# Patient Record
Sex: MALE | Race: WHITE | NOT HISPANIC OR LATINO | Employment: FULL TIME | URBAN - METROPOLITAN AREA
[De-identification: names, ages, dates, MRNs, and addresses within clinical notes are randomized per-mention and may not be internally consistent; named-entity substitution may affect disease eponyms.]

---

## 2017-01-31 ENCOUNTER — RADIANT APPOINTMENT (OUTPATIENT)
Dept: GENERAL RADIOLOGY | Facility: CLINIC | Age: 42
End: 2017-01-31
Attending: PHYSICIAN ASSISTANT
Payer: COMMERCIAL

## 2017-01-31 ENCOUNTER — OFFICE VISIT (OUTPATIENT)
Dept: INTERNAL MEDICINE | Facility: CLINIC | Age: 42
End: 2017-01-31
Payer: COMMERCIAL

## 2017-01-31 VITALS
WEIGHT: 215.9 LBS | SYSTOLIC BLOOD PRESSURE: 150 MMHG | OXYGEN SATURATION: 98 % | BODY MASS INDEX: 29.28 KG/M2 | DIASTOLIC BLOOD PRESSURE: 110 MMHG | TEMPERATURE: 98.6 F | HEART RATE: 108 BPM

## 2017-01-31 DIAGNOSIS — R03.0 ELEVATED BLOOD PRESSURE READING WITHOUT DIAGNOSIS OF HYPERTENSION: ICD-10-CM

## 2017-01-31 DIAGNOSIS — S69.91XA FINGER INJURY, RIGHT, INITIAL ENCOUNTER: ICD-10-CM

## 2017-01-31 DIAGNOSIS — S69.91XA FINGER INJURY, RIGHT, INITIAL ENCOUNTER: Primary | ICD-10-CM

## 2017-01-31 PROCEDURE — 99214 OFFICE O/P EST MOD 30 MIN: CPT | Performed by: PHYSICIAN ASSISTANT

## 2017-01-31 PROCEDURE — 73140 X-RAY EXAM OF FINGER(S): CPT | Mod: RT

## 2017-01-31 NOTE — PATIENT INSTRUCTIONS
- ibuprofen - advil 600 mg three times a day with food.    OR    Aleve - naproxen 2 tab twice a day     Either one of these for 1 week.     Wear the splint for 1-2 week

## 2017-01-31 NOTE — PROGRESS NOTES
SUBJECTIVE:                                                    Kobe Hanson Jr. is a 41 year old male who presents to clinic today for the following health issues:      Joint Pain     Onset: x 2.5 weeks    Description:   Location: R thumb  Character: Sharp and Burning    Intensity: moderate    Progression of Symptoms: same    Accompanying Signs & Symptoms:  Other symptoms: none   History:   Previous similar pain: no       Precipitating factors:   Trauma or overuse: YES--caught himself    Alleviating factors:  Improved by: ice       Therapies Tried and outcome:       -------------------------------------    Problem list and histories reviewed & adjusted, as indicated.  Additional history: as documented    Labs reviewed in EPIC  Problem list, Medication list, Allergies, and Medical/Social/Surgical histories reviewed in [facility] and updated as appropriate.    ROS:  Constitutional, HEENT, cardiovascular, pulmonary, gi and gu systems are negative, except as otherwise noted.    OBJECTIVE:                                                    /110 mmHg  Pulse 108  Temp(Src) 98.6  F (37  C) (Oral)  Wt 215 lb 14.4 oz (97.932 kg)  SpO2 98%  Body mass index is 29.28 kg/(m^2).  GENERAL: healthy, alert and no distress  RESP: lungs clear to auscultation - no rales, rhonchi or wheezes  CV: regular rate and rhythm, normal S1 S2, no S3 or S4, no murmur, click or rub, no peripheral edema and peripheral pulses strong  MS: swelling DIP of the right thumb. No bruising or redness noted.     Diagnostic Test Results:  Prelim- no fx      ASSESSMENT/PLAN:                                                            1. Finger injury, right, initial encounter    - XR Finger Right G/E 2 Views; Future  - order for DME; Equipment being ordered: thumb spica splint right  Dispense: 1 Device; Refill: 0    2. Elevated blood pressure reading without diagnosis of hypertension  Schedule with PCP for PE and labs- hx of elevated BP in the past.        25 minutes spent with patient > 50% of time on counseling and plan of care.     Debo Mccray PA-C  [facility]

## 2017-01-31 NOTE — NURSING NOTE
Chief Complaint   Patient presents with     Musculoskeletal Problem     feel on R hand thumb 2.5 weeks ago        Initial /110 mmHg  Pulse 108  Temp(Src) 98.6  F (37  C) (Oral)  Wt 215 lb 14.4 oz (97.932 kg)  SpO2 98% Estimated body mass index is 29.28 kg/(m^2) as calculated from the following:    Height as of 3/6/14: 6' (1.829 m).    Weight as of this encounter: 215 lb 14.4 oz (97.932 kg).  BP completed using cuff size: regular

## 2017-02-22 ENCOUNTER — OFFICE VISIT (OUTPATIENT)
Dept: INTERNAL MEDICINE | Facility: CLINIC | Age: 42
End: 2017-02-22
Payer: COMMERCIAL

## 2017-02-22 VITALS
OXYGEN SATURATION: 99 % | SYSTOLIC BLOOD PRESSURE: 120 MMHG | BODY MASS INDEX: 29.4 KG/M2 | TEMPERATURE: 98.1 F | HEART RATE: 68 BPM | DIASTOLIC BLOOD PRESSURE: 90 MMHG | WEIGHT: 216.8 LBS

## 2017-02-22 DIAGNOSIS — S69.91XD INJURY OF RIGHT THUMB, SUBSEQUENT ENCOUNTER: Primary | ICD-10-CM

## 2017-02-22 PROCEDURE — 99213 OFFICE O/P EST LOW 20 MIN: CPT | Performed by: PHYSICIAN ASSISTANT

## 2017-02-22 NOTE — MR AVS SNAPSHOT
After Visit Summary   2/22/2017    Kobe Hanson Jr.    MRN: [id number]           Patient Information     Date Of Birth          1975        Visit Information        Provider Department      2/22/2017 7:40 AM Debo Mccray PA-C [facility]        Today's Diagnoses     Injury of right thumb, subsequent encounter    -  1    Elevated BP           Follow-ups after your visit        Additional Services     ORTHOPEDICS ADULT REFERRAL       Your provider has referred you to: [facility] [place] [phone]   [email or web address]     Please be aware that coverage of these services is subject to the terms and limitations of your health insurance plan.  Call member services at your health plan with any benefit or coverage questions.      Please bring the following to your appointment:    >>   Any x-rays, CTs or MRIs which have been performed.  Contact the facility where they were done to arrange for  prior to your scheduled appointment.    >>   List of current medications   >>   This referral request   >>   Any documents/labs given to you for this referral                  Who to contact     If you have questions or need follow up information about today's clinic visit or your schedule please contact [facility] directly at [phone].  Normal or non-critical lab and imaging results will be communicated to you by MyChart, letter or phone within 4 business days after the clinic has received the results. If you do not hear from us within 7 days, please contact the clinic through MyChart or phone. If you have a critical or abnormal lab result, we will notify you by phone as soon as possible.  Submit refill requests through [employer] or call your pharmacy and they will forward the refill request to us. Please allow 3 business days for your refill to be completed.          Additional Information  "About Your Visit        MyChart Information     [facility] lets you send messages to your doctor, view your test results, renew your prescriptions, schedule appointments and more. To sign up, go to [email or web address] . Click on \"Log in\" on the left side of the screen, which will take you to the Welcome page. Then click on \"Sign up Now\" on the right side of the page.     You will be asked to enter the access code listed below, as well as some personal information. Please follow the directions to create your username and password.     Your access code is: [email or web address]  Expires: 2017  4:45 PM     Your access code will  in 90 days. If you need help or a new code, please call your [facility] clinic or [phone].        Care EveryWhere ID     This is your Care EveryWhere ID. This could be used by other organizations to access your [facility] medical records  [id number]        Your Vitals Were     Pulse Temperature Pulse Oximetry BMI (Body Mass Index)          68 98.1  F (36.7  C) (Oral) 99% 29.4 kg/m2         Blood Pressure from Last 3 Encounters:   17 120/90   17 (!) 150/110   14 (!) 134/96    Weight from Last 3 Encounters:   17 216 lb 12.8 oz (98.3 kg)   17 215 lb 14.4 oz (97.9 kg)   14 215 lb 8 oz (97.8 kg)              We Performed the Following     ORTHOPEDICS ADULT REFERRAL        Primary Care Provider Office Phone # Fax #    Santos Jimenez [phone]       [facility] [address] [facility] [address]        Thank you!     Thank you for choosing [facility]  for your care. Our goal is always to provide you with excellent care. Hearing back from our patients is one way we can continue to improve our services. Please take a few minutes to complete the written survey that you may receive in the mail after your visit with us. Thank you!             Your Updated Medication List - Protect others around you: Learn " how to safely use, store and throw away your medicines at www.disposemymeds.org.          This list is accurate as of: 2/22/17  7:54 AM.  Always use your most recent med list.                   Brand Name Dispense Instructions for use    ciclopirox 0.77 % cream    LOPROX    30 g    Apply to chest at bedtime       desonide 0.05 % cream    DESOWEN    60 g    Apply sparingly to affected area twice daily on eyelids       fluocinolone 0.025 % cream    SYNALAR    60 g    Apply to chest once daily       order for DME     1 Device    Equipment being ordered: thumb spica splint right

## 2017-02-22 NOTE — NURSING NOTE
Chief Complaint   Patient presents with     Musculoskeletal Problem     Pain hasnt changed at all        Initial /90 (BP Location: Left arm, Patient Position: Chair, Cuff Size: Adult Regular)  Pulse 68  Temp 98.1  F (36.7  C) (Oral)  Wt 216 lb 12.8 oz (98.3 kg)  SpO2 99%  BMI 29.4 kg/m2 Estimated body mass index is 29.4 kg/(m^2) as calculated from the following:    Height as of 3/6/14: 6' (1.829 m).    Weight as of this encounter: 216 lb 12.8 oz (98.3 kg).  Medication Reconciliation: complete

## 2017-02-22 NOTE — PROGRESS NOTES
SUBJECTIVE:                                                    Kobe Hanson Jr. is a 41 year old male who presents to clinic today for the following health issues:      Joint Pain     Onset: x6 wks     Description:   Location: R thumb  Character: Dull ache    Intensity: mild    Progression of Symptoms: same    Accompanying Signs & Symptoms:  Other symptoms: none   History:   Previous similar pain: no       Precipitating factors:   Trauma or overuse: YES fell on it     Alleviating factors:  Improved by: nothing       Therapies Tried and outcome:     Patient seen at the end of January for this. Xray done then negative.     He did not get a thumb spica splint at prescribed and today is wearing a foam finger splint.    Issues with pain that the DIP of the right thumb. Feels that he does not have good strength due to pain there.           -------------------------------------    Problem list and histories reviewed & adjusted, as indicated.  Additional history: as documented    Labs reviewed in EPIC  Problem list, Medication list, Allergies, and Medical/Social/Surgical histories reviewed in [facility] and updated as appropriate.    ROS:  Constitutional, HEENT, cardiovascular, pulmonary, gi and gu systems are negative, except as otherwise noted.    OBJECTIVE:                                                    /90 (BP Location: Left arm, Patient Position: Chair, Cuff Size: Adult Regular)  Pulse 68  Temp 98.1  F (36.7  C) (Oral)  Wt 216 lb 12.8 oz (98.3 kg)  SpO2 99%  BMI 29.4 kg/m2  Body mass index is 29.4 kg/(m^2).  GENERAL: healthy, alert and no distress  RESP: lungs clear to auscultation - no rales, rhonchi or wheezes  CV: regular rates and rhythm and no murmur, click or rub  MS: right thumb, slight swelling DIP joint    Diagnostic Test Results:  Results for orders placed or performed in visit on 01/31/17   XR Finger Right G/E 2 Views    Narrative    FINGER RIGHT TWO OR MORE VIEWS 1/31/2017 4:59 PM     HISTORY:  Injury right thumb distal. Unspecified injury of right wrist,  hand and finger(s), initial encounter.      Impression    IMPRESSION: Three views of the right thumb are obtained. There is no  fracture or dislocation. Joint spaces appear normal. No radiopaque  foreign body is appreciated. No soft tissue swelling is noted  radiographically.    XU CREWS MD        ASSESSMENT/PLAN:                                                            1. Injury of right thumb, subsequent encounter    - ORTHOPEDICS ADULT REFERRAL    2. Elevated BP        See hand specialist  F/u with PCP on BP as recommended at last visit.     Debo Mccray PA-C  [facility]

## 2017-02-24 ENCOUNTER — TRANSFERRED RECORDS (OUTPATIENT)
Dept: HEALTH INFORMATION MANAGEMENT | Facility: CLINIC | Age: 42
End: 2017-02-24

## 2018-02-19 ENCOUNTER — NURSE TRIAGE (OUTPATIENT)
Dept: NURSING | Facility: CLINIC | Age: 43
End: 2018-02-19

## 2018-02-19 NOTE — TELEPHONE ENCOUNTER
Reason for Disposition    Numbness (loss of sensation) in groin or rectal area    Additional Information    Negative: Dangerous mechanism of injury (e.g., MVA, contact sports, trampoline, diving, fall > 10 feet or 3 meters)  (Exception: back pain began > 1 hour after injury)    Negative: [1] Weakness (i.e., paralysis, loss of muscle strength) of the leg(s) or foot AND [2] sudden onset after back injury    Negative: [1] Numbness (i.e., loss of sensation) of the leg(s) or foot AND [2] sudden onset after back injury    Negative: [1] Major bleeding (e.g., actively dripping or spurting) AND [2] can't be stopped    Negative: Bullet wound, knife wound, or other penetrating object    Negative: Shock suspected (e.g., cold/pale/clammy skin, too weak to stand, low BP, rapid pulse)    Negative: Sounds like a life-threatening emergency to the triager    Negative: [1] Injuries at more than 1 site AND [2] unsure which guideline to use    Negative: Injury to the neck    Negative: Injury to the tailbone    Negative: Back pain not from an injury    Negative: Back pain from overuse (work, exercise, gardening) OR from twisting, lifting, or bending injury    Negative: [1] SEVERE PAIN in kidney area (flank) AND [2] follows direct blow to that site    Negative: Blood in urine (red, pink, or tea-colored)    Negative: [1] Unable to urinate (or only a few drops) > 4 hours AND     [2] bladder feels very full (e.g., palpable bladder or strong urge to urinate)    Negative: [1] Urinary or bowel incontinence (i.e., loss of bladder or bowel control) AND [2] new onset    Protocols used: BACK INJURY-ADULT-AH    He was doing yoga on Saturday when he pulled something.  He can barely walk. Is going to ice it. Should I see my primary or go to the ER? Advised the ER. His wife will bring him for evaluation.  Jacinda Ballard RN-[facility]

## 2018-03-21 ENCOUNTER — OFFICE VISIT (OUTPATIENT)
Dept: URGENT CARE | Facility: URGENT CARE | Age: 43
End: 2018-03-21
Payer: COMMERCIAL

## 2018-03-21 VITALS
HEART RATE: 96 BPM | OXYGEN SATURATION: 100 % | DIASTOLIC BLOOD PRESSURE: 90 MMHG | RESPIRATION RATE: 18 BRPM | SYSTOLIC BLOOD PRESSURE: 120 MMHG | WEIGHT: 213.6 LBS | TEMPERATURE: 97.6 F | HEIGHT: 72 IN | BODY MASS INDEX: 28.93 KG/M2

## 2018-03-21 DIAGNOSIS — R35.0 URINARY FREQUENCY: Primary | ICD-10-CM

## 2018-03-21 DIAGNOSIS — R10.9 FLANK PAIN: ICD-10-CM

## 2018-03-21 LAB
ALBUMIN UR-MCNC: NEGATIVE MG/DL
ANION GAP SERPL CALCULATED.3IONS-SCNC: 5 MMOL/L (ref 3–14)
APPEARANCE UR: CLEAR
BASOPHILS # BLD AUTO: 0 10E9/L (ref 0–0.2)
BASOPHILS NFR BLD AUTO: 0.6 %
BILIRUB UR QL STRIP: NEGATIVE
BUN SERPL-MCNC: 15 MG/DL (ref 7–30)
CALCIUM SERPL-MCNC: 9.2 MG/DL (ref 8.5–10.1)
CHLORIDE SERPL-SCNC: 104 MMOL/L (ref 94–109)
CO2 SERPL-SCNC: 30 MMOL/L (ref 20–32)
COLOR UR AUTO: YELLOW
CREAT SERPL-MCNC: 0.96 MG/DL (ref 0.66–1.25)
DIFFERENTIAL METHOD BLD: NORMAL
EOSINOPHIL # BLD AUTO: 0.2 10E9/L (ref 0–0.7)
EOSINOPHIL NFR BLD AUTO: 2.7 %
ERYTHROCYTE [DISTWIDTH] IN BLOOD BY AUTOMATED COUNT: 11.6 % (ref 10–15)
GFR SERPL CREATININE-BSD FRML MDRD: 87 ML/MIN/1.7M2
GLUCOSE SERPL-MCNC: 95 MG/DL (ref 70–99)
GLUCOSE UR STRIP-MCNC: NEGATIVE MG/DL
HCT VFR BLD AUTO: 49.4 % (ref 40–53)
HGB BLD-MCNC: 17.6 G/DL (ref 13.3–17.7)
HGB UR QL STRIP: NEGATIVE
KETONES UR STRIP-MCNC: NEGATIVE MG/DL
LEUKOCYTE ESTERASE UR QL STRIP: NEGATIVE
LYMPHOCYTES # BLD AUTO: 1.9 10E9/L (ref 0.8–5.3)
LYMPHOCYTES NFR BLD AUTO: 27.2 %
MCH RBC QN AUTO: 32.6 PG (ref 26.5–33)
MCHC RBC AUTO-ENTMCNC: 35.6 G/DL (ref 31.5–36.5)
MCV RBC AUTO: 92 FL (ref 78–100)
MONOCYTES # BLD AUTO: 0.6 10E9/L (ref 0–1.3)
MONOCYTES NFR BLD AUTO: 8.4 %
NEUTROPHILS # BLD AUTO: 4.4 10E9/L (ref 1.6–8.3)
NEUTROPHILS NFR BLD AUTO: 61.1 %
NITRATE UR QL: NEGATIVE
PH UR STRIP: 6.5 PH (ref 5–7)
PLATELET # BLD AUTO: 215 10E9/L (ref 150–450)
POTASSIUM SERPL-SCNC: 3.6 MMOL/L (ref 3.4–5.3)
RBC # BLD AUTO: 5.4 10E12/L (ref 4.4–5.9)
RBC #/AREA URNS AUTO: NORMAL /HPF
SODIUM SERPL-SCNC: 139 MMOL/L (ref 133–144)
SOURCE: NORMAL
SP GR UR STRIP: <=1.005 (ref 1–1.03)
UROBILINOGEN UR STRIP-ACNC: 0.2 EU/DL (ref 0.2–1)
WBC # BLD AUTO: 7.1 10E9/L (ref 4–11)
WBC #/AREA URNS AUTO: NORMAL /HPF

## 2018-03-21 PROCEDURE — 85025 COMPLETE CBC W/AUTO DIFF WBC: CPT | Performed by: PHYSICIAN ASSISTANT

## 2018-03-21 PROCEDURE — 80048 BASIC METABOLIC PNL TOTAL CA: CPT | Performed by: PHYSICIAN ASSISTANT

## 2018-03-21 PROCEDURE — 87591 N.GONORRHOEAE DNA AMP PROB: CPT | Performed by: PHYSICIAN ASSISTANT

## 2018-03-21 PROCEDURE — 87086 URINE CULTURE/COLONY COUNT: CPT | Performed by: PHYSICIAN ASSISTANT

## 2018-03-21 PROCEDURE — 99214 OFFICE O/P EST MOD 30 MIN: CPT | Performed by: PHYSICIAN ASSISTANT

## 2018-03-21 PROCEDURE — 87491 CHLMYD TRACH DNA AMP PROBE: CPT | Performed by: PHYSICIAN ASSISTANT

## 2018-03-21 PROCEDURE — 36415 COLL VENOUS BLD VENIPUNCTURE: CPT | Performed by: PHYSICIAN ASSISTANT

## 2018-03-21 PROCEDURE — 81001 URINALYSIS AUTO W/SCOPE: CPT | Performed by: PHYSICIAN ASSISTANT

## 2018-03-21 RX ORDER — IBUPROFEN 800 MG/1
800 TABLET, FILM COATED ORAL EVERY 8 HOURS PRN
Qty: 30 TABLET | Refills: 0 | Status: SHIPPED | OUTPATIENT
Start: 2018-03-21 | End: 2020-03-12

## 2018-03-21 RX ORDER — ACETAMINOPHEN AND CODEINE PHOSPHATE 300; 30 MG/1; MG/1
1-2 TABLET ORAL EVERY 4 HOURS PRN
Qty: 10 TABLET | Refills: 0 | Status: SHIPPED | OUTPATIENT
Start: 2018-03-21 | End: 2019-08-20

## 2018-03-21 NOTE — PROGRESS NOTES
SUBJECTIVE:   Kobe Hanson Jr. is a 42 year old male who  presents today for a possible UTI.   Complains of right sided flank pain since 10 am.  Was at chiropractor for a maintenance appointment, and developed the pain shortly thereafter with urinary frequency.  Denies any penile discharge or dysuria.    Denies any abdominal pain.  Denies any blood in the urine.      He has not taken anything for the symptoms.     He states that he has urinated about 40 times since the onset of the symptoms.    Had a foot cramp this morning, and thought that he was dehydrated, and he as had 2 more 16 oz iced teas than he usually has in a morning, he usually has 4 and has a coffee and a couple of henry.      Foot has not bothered him since.      Denies any lifting injury.      He was at chiropractor for a routine adjustment.      No past medical history on file.  Patient Active Problem List   Diagnosis     Hyperlipidemia LDL goal <160     Elevated BP     Abnormal results of liver function studies     Social History   Substance Use Topics     Smoking status: Former Smoker     Quit date: 2/3/2007     Smokeless tobacco: Never Used     Alcohol use Yes       ROS:   CONSTITUTIONAL:NEGATIVE for fever, chills, change in weight  INTEGUMENTARY/SKIN: NEGATIVE for worrisome rashes, moles or lesions  EYES: NEGATIVE for vision changes or irritation  ENT/MOUTH: NEGATIVE for ear, mouth and throat problems  RESP:NEGATIVE for significant cough or SOB  CV: NEGATIVE for chest pain, palpitations or peripheral edema  GI: NEGATIVE for nausea, abdominal pain, heartburn, or change in bowel habits  : as per HPI  MUSCULOSKELETAL: as per HPI  NEURO: NEGATIVE for weakness, dizziness or paresthesias  Review of systems negative except as stated above.    OBJECTIVE:  /90  Pulse 96  Temp 97.6  F (36.4  C) (Oral)  Resp 18  Ht 6' (1.829 m)  Wt 213 lb 9.6 oz (96.9 kg)  SpO2 100%  BMI 28.97 kg/m2  GENERAL APPEARANCE: healthy, alert and no  distress  ABDOMEN:  soft, nontender, no HSM or masses and bowel sounds normal  BACK: No CVA tenderness  GU_male: deferred  SKIN: no suspicious lesions or rashes    (R35.0) Urinary frequency  (primary encounter diagnosis)  Comment: possibly secondary to increased input today  Plan: UA with Microscopic reflex to Culture,         NEISSERIA GONORRHOEA PCR, CHLAMYDIA TRACHOMATIS        PCR, Basic metabolic panel  (Ca, Cl, CO2,         Creat, Gluc, K, Na, BUN), CBC with platelets         and differential, Urine Culture Aerobic         Bacterial            (R10.9) Flank pain  Comment: consistent with possible strain econdary to chiropractic adjustment today?   Plan: ibuprofen (ADVIL/MOTRIN) 800 MG tablet,         acetaminophen-codeine (TYLENOL WITH CODEINE #3)        300-30 MG per tablet    Follow up with Dr. Jimenez should symptoms persist or worsen.

## 2018-03-21 NOTE — PATIENT INSTRUCTIONS
(R35.0) Urinary frequency  (primary encounter diagnosis)  Comment: possibly secondary to increased input today  Plan: UA with Microscopic reflex to Culture,         NEISSERIA GONORRHOEA PCR, CHLAMYDIA TRACHOMATIS        PCR, Basic metabolic panel  (Ca, Cl, CO2,         Creat, Gluc, K, Na, BUN), CBC with platelets         and differential, Urine Culture Aerobic         Bacterial            (R10.9) Flank pain  Comment: consistent with possible strain econdary to chiropractic adjustment today?   Plan: ibuprofen (ADVIL/MOTRIN) 800 MG tablet,         acetaminophen-codeine (TYLENOL WITH CODEINE #3)        300-30 MG per tablet    Follow up with Dr. Jimenez should symptoms persist or worsen.

## 2018-03-21 NOTE — MR AVS SNAPSHOT
After Visit Summary   3/21/2018    Kobe Hanson Jr.    MRN: [id number]           Patient Information     Date Of Birth          1975        Visit Information        Provider Department      3/21/2018 2:45 PM Lou Ulrich PA-C [facility]        Today's Diagnoses     Urinary frequency    -  1    Flank pain          Care Instructions    (R35.0) Urinary frequency  (primary encounter diagnosis)  Comment: possibly secondary to increased input today  Plan: UA with Microscopic reflex to Culture,         NEISSERIA GONORRHOEA PCR, CHLAMYDIA TRACHOMATIS        PCR, Basic metabolic panel  (Ca, Cl, CO2,         Creat, Gluc, K, Na, BUN), CBC with platelets         and differential, Urine Culture Aerobic         Bacterial            (R10.9) Flank pain  Comment: consistent with possible strain econdary to chiropractic adjustment today?   Plan: ibuprofen (ADVIL/MOTRIN) 800 MG tablet,         acetaminophen-codeine (TYLENOL WITH CODEINE #3)        300-30 MG per tablet    Follow up with Dr. Jimenez should symptoms persist or worsen.                        Follow-ups after your visit        Who to contact     If you have questions or need follow up information about today's clinic visit or your schedule please contact [facility] directly at [phone].  Normal or non-critical lab and imaging results will be communicated to you by MyChart, letter or phone within 4 business days after the clinic has received the results. If you do not hear from us within 7 days, please contact the clinic through MyChart or phone. If you have a critical or abnormal lab result, we will notify you by phone as soon as possible.  Submit refill requests through [employer] or call your pharmacy and they will forward the refill request to us. Please allow 3 business days for your refill to be completed.          Additional Information About Your Visit        MyChart  "Information     [facility] lets you send messages to your doctor, view your test results, renew your prescriptions, schedule appointments and more. To sign up, go to [email or web address] . Click on \"Log in\" on the left side of the screen, which will take you to the Welcome page. Then click on \"Sign up Now\" on the right side of the page.     You will be asked to enter the access code listed below, as well as some personal information. Please follow the directions to create your username and password.     Your access code is: [email or web address]  Expires: 2018  4:49 PM     Your access code will  in 90 days. If you need help or a new code, please call your [place] clinic or [phone].        Care EveryWhere ID     This is your Care EveryWhere ID. This could be used by other organizations to access your [place] medical records  [id number]        Your Vitals Were     Pulse Temperature Respirations Height Pulse Oximetry BMI (Body Mass Index)    96 97.6  F (36.4  C) (Oral) 18 6' (1.829 m) 100% 28.97 kg/m2       Blood Pressure from Last 3 Encounters:   18 120/90   17 120/90   17 (!) 150/110    Weight from Last 3 Encounters:   18 213 lb 9.6 oz (96.9 kg)   17 216 lb 12.8 oz (98.3 kg)   17 215 lb 14.4 oz (97.9 kg)              We Performed the Following     Basic metabolic panel  (Ca, Cl, CO2, Creat, Gluc, K, Na, BUN)     CBC with platelets and differential     CHLAMYDIA TRACHOMATIS PCR     NEISSERIA GONORRHOEA PCR     UA with Microscopic reflex to Culture     Urine Culture Aerobic Bacterial          Today's Medication Changes          These changes are accurate as of 3/21/18  4:49 PM.  If you have any questions, ask your nurse or doctor.               Start taking these medicines.        Dose/Directions    acetaminophen-codeine 300-30 MG per tablet   Commonly known as:  TYLENOL WITH CODEINE #3   Used for:  Flank pain   Started by:  Lou Ulrich PA-C        Dose:  " 1-2 tablet   Take 1-2 tablets by mouth every 4 hours as needed   Quantity:  10 tablet   Refills:  0       ibuprofen 800 MG tablet   Commonly known as:  ADVIL/MOTRIN   Used for:  Flank pain   Started by:  Lou Ulrich PA-C        Dose:  800 mg   Take 1 tablet (800 mg) by mouth every 8 hours as needed for moderate pain   Quantity:  30 tablet   Refills:  0            Where to get your medicines      These medications were sent to [place] [address] [place] [facility] [address] [address] [address]    Hours:  Tech issues with their phone system Phone:  [phone]     ibuprofen 800 MG tablet         Some of these will need a paper prescription and others can be bought over the counter.  Ask your nurse if you have questions.     Bring a paper prescription for each of these medications     acetaminophen-codeine 300-30 MG per tablet                Primary Care Provider Office Phone # Fax #    Santos Jimenez [phone]       [address] [place] [address]        Equal Access to Services     JEVON HOWARD : Hadii aad ku hadasho Soomaali, waaxda luqadaha, qaybta kaalmada adeegyada, waxay idiin hayaan alexandra clark . So [facility] [phone].    ATENCIÓN: Si habla español, tiene a edge disposición servicios gratuitos de asistencia lingüística. Ll[facility] [phone].    We comply with applicable federal civil rights laws and [place] laws. We do not discriminate on the basis of race, color, national origin, age, disability, sex, sexual orientation, or gender identity.            Thank you!     Thank you for choosing [facility]  for your care. Our goal is always to provide you with excellent care. Hearing back from our patients is one way we can continue to improve our services. Please take a few minutes to complete the written survey that you may receive in the mail after your visit with us. Thank you!             Your  Updated Medication List - Protect others around you: Learn how to safely use, store and throw away your medicines at www.disposemymeds.org.          This list is accurate as of 3/21/18  4:49 PM.  Always use your most recent med list.                   Brand Name Dispense Instructions for use Diagnosis    acetaminophen-codeine 300-30 MG per tablet    TYLENOL WITH CODEINE #3    10 tablet    Take 1-2 tablets by mouth every 4 hours as needed    Flank pain       ciclopirox 0.77 % cream    LOPROX    30 g    Apply to chest at bedtime    Dermatitis, Seborrheic dermatitis       desonide 0.05 % cream    DESOWEN    60 g    Apply sparingly to affected area twice daily on eyelids    Dermatitis, Seborrheic dermatitis       fluocinolone 0.025 % cream    SYNALAR    60 g    Apply to chest once daily    Dermatitis, Seborrheic dermatitis       ibuprofen 800 MG tablet    ADVIL/MOTRIN    30 tablet    Take 1 tablet (800 mg) by mouth every 8 hours as needed for moderate pain    Flank pain       order for DME     1 Device    Equipment being ordered: thumb spica splint right    Finger injury, right, initial encounter

## 2018-03-22 LAB
C TRACH DNA SPEC QL NAA+PROBE: NEGATIVE
N GONORRHOEA DNA SPEC QL NAA+PROBE: NEGATIVE
SPECIMEN SOURCE: NORMAL
SPECIMEN SOURCE: NORMAL

## 2018-03-23 LAB
BACTERIA SPEC CULT: NORMAL
SPECIMEN SOURCE: NORMAL

## 2019-08-20 ENCOUNTER — OFFICE VISIT (OUTPATIENT)
Dept: INTERNAL MEDICINE | Facility: CLINIC | Age: 44
End: 2019-08-20
Payer: COMMERCIAL

## 2019-08-20 VITALS
SYSTOLIC BLOOD PRESSURE: 126 MMHG | RESPIRATION RATE: 15 BRPM | WEIGHT: 215.7 LBS | TEMPERATURE: 97.1 F | HEART RATE: 101 BPM | HEIGHT: 72 IN | DIASTOLIC BLOOD PRESSURE: 82 MMHG | OXYGEN SATURATION: 97 % | BODY MASS INDEX: 29.22 KG/M2

## 2019-08-20 DIAGNOSIS — G89.29 CHRONIC LOW BACK PAIN, UNSPECIFIED BACK PAIN LATERALITY, WITH SCIATICA PRESENCE UNSPECIFIED: Primary | ICD-10-CM

## 2019-08-20 DIAGNOSIS — M54.5 CHRONIC LOW BACK PAIN, UNSPECIFIED BACK PAIN LATERALITY, WITH SCIATICA PRESENCE UNSPECIFIED: Primary | ICD-10-CM

## 2019-08-20 PROCEDURE — 99214 OFFICE O/P EST MOD 30 MIN: CPT | Performed by: INTERNAL MEDICINE

## 2019-08-20 ASSESSMENT — MIFFLIN-ST. JEOR: SCORE: 1916.41

## 2019-08-20 NOTE — PROGRESS NOTES
Subjective     Kobe Hanson Jr. is a 43 year old male who presents to clinic today for the following health issues:    HPI     Patient not seen by Dr. Jimenez since 2014.    Been seeing [facility] recently now with new provider.  Patient states that he is had ongoing issues with back pain for almost 2 decades a seeing a chiropractor.  He recently switch chiropractors who is now been treating his back and he is seen some improvement but is been left with some mild pain and discomfort particularly when he sits at a 90 degree angle in his low back.  Because of this the chiropractor recommended an MRI for further evaluation.    Back Pain       Duration: 20+ yrs - patients chiropractor ([facility]) suggested patient have MRI to evaluate lower back         Specific cause: none    Description:   Location of pain: low back both L>R   Character of pain: dull ache, sharp pain with other extending back   Pain radiation: Previously radiated down left leg, has improved with chiropractor/ PT  New numbness or weakness in legs, not attributed to pain:  no - resolved     Intensity: mild    History:   Pain interferes with job: YES  History of back problems: no prior back problems  Any previous MRI or X-rays: Yes--at [facility] .  Date: fall, 2018  Sees a specialist for back pain:  Triage wellness chiropractor and PT   Therapies tried without relief: Chiropractic     Alleviating factors:   Improved by: Physical Therapy, TEN's stimulator, chiropractics, PT    Precipitating factors:  Worsened by: leaning backward       Accompanying Signs & Symptoms:  Risk of Fracture:  None  Risk of Cauda Equina:  None  Risk of Infection:  None  Risk of Cancer:  None  Risk of Ankylosing Spondylitis:  Onset at age <35, male, AND morning back stiffness. no         Patient Active Problem List   Diagnosis     Hyperlipidemia LDL goal <160     Elevated BP     Abnormal results of liver function studies     Past  Surgical History:   Procedure Laterality Date     VASECTOMY  2010       Social History     Tobacco Use     Smoking status: Former Smoker     Last attempt to quit: 2/3/2007     Years since quittin.5     Smokeless tobacco: Never Used   Substance Use Topics     Alcohol use: Yes     Family History   Problem Relation Age of Onset     Hypertension Father          Current Outpatient Medications   Medication Sig Dispense Refill     ibuprofen (ADVIL/MOTRIN) 800 MG tablet Take 1 tablet (800 mg) by mouth every 8 hours as needed for moderate pain 30 tablet 0     Allergies   Allergen Reactions     Dust Mite Extract      Mold      Trees      BP Readings from Last 3 Encounters:   18 120/90   17 120/90   17 (!) 150/110    Wt Readings from Last 3 Encounters:   18 96.9 kg (213 lb 9.6 oz)   17 98.3 kg (216 lb 12.8 oz)   17 97.9 kg (215 lb 14.4 oz)         Reviewed and updated as needed this visit by Provider         Review of Systems   ROS COMP: CONSTITUTIONAL: NEGATIVE for fever, chills, change in weight  ENT/MOUTH: NEGATIVE for ear, mouth and throat problems  RESP: NEGATIVE for significant cough or SOB  CV: NEGATIVE for chest pain, palpitations or peripheral edema  GI: NEGATIVE for nausea, abdominal pain, heartburn, or change in bowel habits  : NEGATIVE for frequency, dysuria, or hematuria  NEURO: NEGATIVE for weakness, dizziness or paresthesias  HEME: NEGATIVE for bleeding problems  PSYCHIATRIC: NEGATIVE for changes in mood or affect      Objective    /82   Pulse 101   Temp 97.1  F (36.2  C) (Tympanic)   Resp 15   Ht 1.829 m (6')   Wt 97.8 kg (215 lb 11.2 oz)   SpO2 97%   BMI 29.25 kg/m    Body mass index is 29.25 kg/m .  Physical Exam   GENERAL:  alert and no distressn  RESP: lungs clear to auscultation - no rales, rhonchi or wheezes  CV: regular rate and rhythm, normal S1 S2, no click or rub, no peripheral edema and peripheral pulses strong  MS: no gross musculoskeletal  defects noted  NEURO: No focal changes, gait is preserved.  Patient arises from the seated position without any functional impairment.  There is minimal evidence of subjective pain  PSYCH: mentation appears normal, affect normal/bright      Assessment & Plan     1. Chronic low back pain, unspecified back pain laterality, with sciatica presence unspecified  I have advised the patient that I would be happy to order an MRI for him although it is unlikely that I feel that the MRI is going to lead to any major revelation in regards to his current symptoms and thus ongoing physical therapy and/or chiropractic care may be continued.  But it would be reasonable to evaluate his low back for evidence of spondylosis or facet impingement.  I have informed the patient that if in fact he needs to get the results to his chiropractor he needs to make sure that he tells the radiology department that he will need copies of his MRI.    - MR Lumbar Spine w/o Contrast; Future     See Patient Instructions    Return for diagnostic test as ordered.    Santos Jimenez MD  [facility]

## 2019-08-26 ENCOUNTER — HOSPITAL ENCOUNTER (OUTPATIENT)
Dept: MRI IMAGING | Facility: CLINIC | Age: 44
Discharge: HOME | End: 2019-08-26
Attending: INTERNAL MEDICINE | Admitting: INTERNAL MEDICINE
Payer: COMMERCIAL

## 2019-08-26 DIAGNOSIS — G89.29 CHRONIC LOW BACK PAIN, UNSPECIFIED BACK PAIN LATERALITY, WITH SCIATICA PRESENCE UNSPECIFIED: ICD-10-CM

## 2019-08-26 DIAGNOSIS — M54.5 CHRONIC LOW BACK PAIN, UNSPECIFIED BACK PAIN LATERALITY, WITH SCIATICA PRESENCE UNSPECIFIED: ICD-10-CM

## 2019-08-26 PROCEDURE — 72148 MRI LUMBAR SPINE W/O DYE: CPT

## 2020-03-02 ENCOUNTER — HEALTH MAINTENANCE LETTER (OUTPATIENT)
Age: 45
End: 2020-03-02

## 2020-03-12 ENCOUNTER — OFFICE VISIT (OUTPATIENT)
Dept: INTERNAL MEDICINE | Facility: CLINIC | Age: 45
End: 2020-03-12
Payer: COMMERCIAL

## 2020-03-12 VITALS
RESPIRATION RATE: 14 BRPM | HEART RATE: 80 BPM | OXYGEN SATURATION: 98 % | SYSTOLIC BLOOD PRESSURE: 136 MMHG | DIASTOLIC BLOOD PRESSURE: 84 MMHG | HEIGHT: 72 IN | WEIGHT: 213.4 LBS | TEMPERATURE: 98.5 F | BODY MASS INDEX: 28.91 KG/M2

## 2020-03-12 DIAGNOSIS — E78.5 HYPERLIPIDEMIA LDL GOAL <160: ICD-10-CM

## 2020-03-12 DIAGNOSIS — Z00.00 ROUTINE GENERAL MEDICAL EXAMINATION AT A HEALTH CARE FACILITY: Primary | ICD-10-CM

## 2020-03-12 DIAGNOSIS — M51.369 DDD (DEGENERATIVE DISC DISEASE), LUMBAR: ICD-10-CM

## 2020-03-12 LAB
ALBUMIN SERPL-MCNC: 3.9 G/DL (ref 3.4–5)
ALP SERPL-CCNC: 58 U/L (ref 40–150)
ALT SERPL W P-5'-P-CCNC: 56 U/L (ref 0–70)
ANION GAP SERPL CALCULATED.3IONS-SCNC: 5 MMOL/L (ref 3–14)
AST SERPL W P-5'-P-CCNC: 24 U/L (ref 0–45)
BILIRUB SERPL-MCNC: 0.8 MG/DL (ref 0.2–1.3)
BUN SERPL-MCNC: 17 MG/DL (ref 7–30)
CALCIUM SERPL-MCNC: 9 MG/DL (ref 8.5–10.1)
CHLORIDE SERPL-SCNC: 106 MMOL/L (ref 94–109)
CHOLEST SERPL-MCNC: 228 MG/DL
CO2 SERPL-SCNC: 28 MMOL/L (ref 20–32)
CREAT SERPL-MCNC: 0.95 MG/DL (ref 0.66–1.25)
GFR SERPL CREATININE-BSD FRML MDRD: >90 ML/MIN/{1.73_M2}
GLUCOSE SERPL-MCNC: 94 MG/DL (ref 70–99)
HDLC SERPL-MCNC: 60 MG/DL
HGB BLD-MCNC: 17.5 G/DL (ref 13.3–17.7)
LDLC SERPL CALC-MCNC: 138 MG/DL
NONHDLC SERPL-MCNC: 168 MG/DL
POTASSIUM SERPL-SCNC: 4.3 MMOL/L (ref 3.4–5.3)
PROT SERPL-MCNC: 7.5 G/DL (ref 6.8–8.8)
SODIUM SERPL-SCNC: 139 MMOL/L (ref 133–144)
TRIGL SERPL-MCNC: 149 MG/DL

## 2020-03-12 PROCEDURE — 90686 IIV4 VACC NO PRSV 0.5 ML IM: CPT | Performed by: INTERNAL MEDICINE

## 2020-03-12 PROCEDURE — 99213 OFFICE O/P EST LOW 20 MIN: CPT | Mod: 25 | Performed by: INTERNAL MEDICINE

## 2020-03-12 PROCEDURE — 36415 COLL VENOUS BLD VENIPUNCTURE: CPT | Performed by: INTERNAL MEDICINE

## 2020-03-12 PROCEDURE — 85018 HEMOGLOBIN: CPT | Performed by: INTERNAL MEDICINE

## 2020-03-12 PROCEDURE — 80061 LIPID PANEL: CPT | Performed by: INTERNAL MEDICINE

## 2020-03-12 PROCEDURE — 80053 COMPREHEN METABOLIC PANEL: CPT | Performed by: INTERNAL MEDICINE

## 2020-03-12 PROCEDURE — 99396 PREV VISIT EST AGE 40-64: CPT | Mod: 25 | Performed by: INTERNAL MEDICINE

## 2020-03-12 PROCEDURE — 90471 IMMUNIZATION ADMIN: CPT | Performed by: INTERNAL MEDICINE

## 2020-03-12 ASSESSMENT — MIFFLIN-ST. JEOR: SCORE: 1900.98

## 2020-03-12 NOTE — LETTER
[facility]  [address] [address]  [phone]      3/12/2020       Kobe Hanson Jr.  [address] [facility] [address]        Dear Kobe,    I am pleased to inform you that your routine blood work including your hemoglobin, sodium, potassium, calcium, kidney and liver function tests are all normal.    Your cholesterol is slightly high and could be treated more aggressively with better diet, exercise and weight loss.  Please follow-up with me to discuss your further medication options/changes if you are interested.    Sincerely,      Santos Jimenez MD  Internal Medicine

## 2020-03-12 NOTE — PROGRESS NOTES
3  SUBJECTIVE:   CC: Kobe Hanson Jr. is an 44 year old male who presents for preventive health visit.     Answers for HPI/ROS submitted by the patient on 3/12/2020   Annual Exam:  Frequency of exercise:: 4-5 days/week  Getting at least 3 servings of Calcium per day:: NO  Diet:: Gluten-free/reduced  Taking medications regularly:: Yes  Medication side effects:: Not applicable  Bi-annual eye exam:: NO  Dental care twice a year:: Yes  Sleep apnea or symptoms of sleep apnea:: None  Additional concerns today:: Yes  Duration of exercise:: 45-60 minutes      PROBLEMS TO ADD ON...  1. Follow up on chronic LBP- patient had MRI done 19. Doing PT and using CBD oil with minimal relief     Today's PHQ-2 Score:   PHQ-2 (  [employer]) 3/12/2020 2019   Q1: Little interest or pleasure in doing things 0 0   Q2: Feeling down, depressed or hopeless 0 0   PHQ-2 Score 0 0   Q1: Little interest or pleasure in doing things Not at all -   Q2: Feeling down, depressed or hopeless Not at all -   PHQ-2 Score 0 -       Abuse: Current or Past(Physical, Sexual or Emotional)- No  Do you feel safe in your environment? Yes      Social History     Tobacco Use     Smoking status: Former Smoker     Last attempt to quit: 2/3/2007     Years since quittin.1     Smokeless tobacco: Never Used   Substance Use Topics     Alcohol use: Yes     If you drink alcohol do you typically have >3 drinks per day or >7 drinks per week? No                      Last PSA: No results found for: PSA    Reviewed orders with patient. Reviewed health maintenance and updated orders accordingly - Yes      Reviewed and updated as needed this visit by clinical staff         Reviewed and updated as needed this visit by Provider        ROS:  CONSTITUTIONAL: NEGATIVE for fever, chills, change in weight  INTEGUMENTARY/SKIN: NEGATIVE for worrisome rashes, moles or lesions  EYES: NEGATIVE for vision changes or irritation  ENT: NEGATIVE for ear, mouth and throat  problems  RESP: NEGATIVE for significant cough or SOB  CV: NEGATIVE for chest pain, palpitations or peripheral edema  GI: NEGATIVE for nausea, abdominal pain, heartburn, or change in bowel habits   male: negative for dysuria, hematuria, decreased urinary stream, erectile dysfunction, urethral discharge  MUSCULOSKELETAL: + for significant arthralgias or myalgia of Low back  NEURO: NEGATIVE for weakness, dizziness or paresthesias  PSYCHIATRIC: NEGATIVE for changes in mood or affect    OBJECTIVE:   /84   Pulse 80   Temp 98.5  F (36.9  C) (Oral)   Resp 14   Ht 1.829 m (6')   Wt 96.8 kg (213 lb 6.4 oz)   SpO2 98%   BMI 28.94 kg/m    EXAM:  GENERAL: healthy, alert and no distress  EYES: Eyes grossly normal to inspection, PERRL and conjunctivae and sclerae normal  HENT: ear canals and TM's normal, nose and mouth without ulcers or lesions  NECK: no adenopathy, no asymmetry, masses, or scars and thyroid normal to palpation  RESP: lungs clear to auscultation - no rales, rhonchi or wheezes  CV: regular rate and rhythm, normal S1 S2, no S3 or S4, no murmur, click or rub, no peripheral edema and peripheral pulses strong  ABDOMEN: soft, nontender, no hepatosplenomegaly, no masses and bowel sounds normal  No hernia, testicles bilaterally normal, genitalia exam otherwise normal  MS: no gross musculoskeletal defects noted, no edema  NEURO: Normal strength and tone, mentation intact and speech normal  PSYCH: mentation appears normal, affect normal/bright  Patient is able to get up on an examination table without significant discomfort to back.    IMPRESSION:    Degenerative disc disease at L3-L4, L4-L5, and L5-S1. Central disc  protrusion at L4-L5 with annular fissure. More subtle disc  abnormalities at L3-4 and L5-S1.       ASSESSMENT/PLAN:   1. Routine general medical examination at a health care facility  Recommend routine update flu vaccination.  - INFLUENZA VACCINE IM > 6 MONTHS VALENT IIV4 [43890]  - INFLUENZA  VACCINE IM > 6 MONTHS VALENT IIV4 [60976]  - VACCINE ADMINISTRATION, INITIAL  - Hemoglobin  - Comprehensive metabolic panel  - Lipid Profile    2. Hyperlipidemia LDL goal <160  Labs ordered as fasting per routine.  - Lipid Profile    3. DDD (degenerative disc disease), lumbar  She would like to see orthopedic a lumbar specialist in regards to candidacy for potential steroid injection based on ongoing back symptoms and noted MRI scan is reviewed with patient  - Orthopedic & Spine  Referral; Future  - OFFICE/OUTPT VISIT,EST,LEVL IV    COUNSELING:  Reviewed preventive health counseling, as reflected in patient instructions       Regular exercise       Healthy diet/nutrition    Estimated body mass index is 29.25 kg/m  as calculated from the following:    Height as of 8/20/19: 1.829 m (6').    Weight as of 8/20/19: 97.8 kg (215 lb 11.2 oz).         reports that he quit smoking about 13 years ago. He has never used smokeless tobacco.      Counseling Resources:  ATP IV Guidelines  Pooled Cohorts Equation Calculator  FRAX Risk Assessment  ICSI Preventive Guidelines  Dietary Guidelines for Americans, 2010  USDA's MyPlate  ASA Prophylaxis  Lung CA Screening    Santos Jimenez MD  [facility]

## 2020-03-17 ENCOUNTER — TELEPHONE (OUTPATIENT)
Dept: PALLIATIVE MEDICINE | Facility: CLINIC | Age: 45
End: 2020-03-17

## 2020-03-17 NOTE — TELEPHONE ENCOUNTER
We re taking every precaution to prevent the spread of COVID-19. Our top priority is to protect and care for our patients.     We are reviewing all new patients to determine if their visit is considered essential. We are balancing helping patients with chronic pain with the safety of our patients and staff.    In review of this upcoming new visit, the determination is:  This is not an urgent issue. Recommend rescheduling at a later date when able to.    This will be sent to our  staff to adjust schedule as determined by provider.    Tiara Montesinos MD on 3/17/2020 at 1:22 PM

## 2020-04-09 NOTE — TELEPHONE ENCOUNTER
Routing to determine if pt can be seen for virtual visit.    Lucila SHIRLEY    [facility]

## 2020-04-09 NOTE — TELEPHONE ENCOUNTER
Recommend that the patient is scheduled for an in-person clinic visit when able to.    Tiara Montesinos MD  [facility]

## 2020-04-23 ENCOUNTER — E-VISIT (OUTPATIENT)
Dept: INTERNAL MEDICINE | Facility: CLINIC | Age: 45
End: 2020-04-23
Payer: COMMERCIAL

## 2020-04-23 ENCOUNTER — VIRTUAL VISIT (OUTPATIENT)
Dept: INTERNAL MEDICINE | Facility: CLINIC | Age: 45
End: 2020-04-23
Payer: COMMERCIAL

## 2020-04-23 VITALS — BODY MASS INDEX: 27.8 KG/M2 | WEIGHT: 205 LBS

## 2020-04-23 DIAGNOSIS — M54.9 BACK PAIN, UNSPECIFIED BACK LOCATION, UNSPECIFIED BACK PAIN LATERALITY, UNSPECIFIED CHRONICITY: Primary | ICD-10-CM

## 2020-04-23 DIAGNOSIS — M54.50 ACUTE RIGHT-SIDED LOW BACK PAIN, UNSPECIFIED WHETHER SCIATICA PRESENT: Primary | ICD-10-CM

## 2020-04-23 PROCEDURE — 99214 OFFICE O/P EST MOD 30 MIN: CPT | Mod: 95 | Performed by: INTERNAL MEDICINE

## 2020-04-23 RX ORDER — CYCLOBENZAPRINE HCL 10 MG
10 TABLET ORAL 3 TIMES DAILY PRN
Qty: 21 TABLET | Refills: 0 | Status: SHIPPED | OUTPATIENT
Start: 2020-04-23 | End: 2020-09-16

## 2020-04-23 RX ORDER — METHYLPREDNISOLONE 4 MG
TABLET, DOSE PACK ORAL
Qty: 21 TABLET | Refills: 0 | Status: SHIPPED | OUTPATIENT
Start: 2020-04-23 | End: 2020-09-16

## 2020-04-23 RX ORDER — HYDROCODONE BITARTRATE AND ACETAMINOPHEN 5; 325 MG/1; MG/1
1 TABLET ORAL EVERY 6 HOURS PRN
Qty: 18 TABLET | Refills: 0 | Status: SHIPPED | OUTPATIENT
Start: 2020-04-23 | End: 2020-04-26

## 2020-04-23 RX ORDER — OMEGA-3 FATTY ACIDS/FISH OIL 300-1000MG
200-800 CAPSULE ORAL PRN
COMMUNITY

## 2020-04-23 NOTE — PROGRESS NOTES
"Kobe Hanson Jr. is a 44 year old male who is being evaluated via a billable telephone visit.      The patient has been notified of following:     \"This telephone visit will be conducted via a call between you and your physician/provider. We have found that certain health care needs can be provided without the need for a physical exam.  This service lets us provide the care you need with a short phone conversation.  If a prescription is necessary we can send it directly to your pharmacy.  If lab work is needed we can place an order for that and you can then stop by our lab to have the test done at a later time.    Telephone visits are billed at different rates depending on your insurance coverage. During this emergency period, for some insurers they may be billed the same as an in-person visit.  Please reach out to your insurance provider with any questions.    If during the course of the call the physician/provider feels a telephone visit is not appropriate, you will not be charged for this service.\"    Patient has given verbal consent for Telephone visit?  Yes    How would you like to obtain your AVS? MyChart    Subjective     Kobe Hanson Jr. is a 44 year old male who presents to clinic today for the following health issues:    HPI     Back Pain       Duration: Began yesterday         Specific cause: Bending over. Bent over yesterday morning and something let loose and patient lost all strength in back and collapsed. Experiencing spasms in back since     Description:   Location of pain: low back right  Character of pain: Muscles locked up, having spasms   Pain radiation:radiates into the right leg to mid thigh   New numbness or weakness in legs, not attributed to pain:  YES- weakness     Intensity: Currently 9/10    History:   Pain interferes with job: YES  History of back problems: Was scheduled with back specalist this month for ongoing chronic pain issues, appointment was postponed until May due to " Covid   Any previous MRI or X-rays: Yes- at [facility].  Date 19  Sees a specialist for back pain:  Not yet. Appt scheduled for 20  Therapies tried without relief: Ibuprofen, heat, tylenol     Alleviating factors:   Improved by: None       Precipitating factors:  Worsened by: Moving from standing to seated position is extremely painful         Accompanying Signs & Symptoms:  Risk of Fracture:  None  Risk of Cauda Equina:  None  Risk of Infection:  None  Risk of Cancer:  None  Risk of Ankylosing Spondylitis:  Onset at age <35, male, AND morning back stiffness. no        Patient Active Problem List   Diagnosis     Hyperlipidemia LDL goal <160     Elevated BP     Abnormal results of liver function studies     Past Surgical History:   Procedure Laterality Date     VASECTOMY  2010       Social History     Tobacco Use     Smoking status: Former Smoker     Last attempt to quit: 2/3/2007     Years since quittin.2     Smokeless tobacco: Never Used   Substance Use Topics     Alcohol use: Yes     Family History   Problem Relation Age of Onset     Hypertension Father          Current Outpatient Medications   Medication Sig Dispense Refill     ibuprofen (ADVIL/MOTRIN) 200 MG capsule Take 200 mg by mouth every 4 hours as needed for fever       Allergies   Allergen Reactions     Dust Mite Extract      Mold      Trees      BP Readings from Last 3 Encounters:   20 136/84   19 126/82   18 120/90    Wt Readings from Last 3 Encounters:   20 93 kg (205 lb)   20 96.8 kg (213 lb 6.4 oz)   19 97.8 kg (215 lb 11.2 oz)            Reviewed and updated as needed this visit by Provider         Review of Systems   ROS COMP: CONSTITUTIONAL: NEGATIVE for fever, chills, change in weight  EYES: NEGATIVE for vision changes or irritation  ENT/MOUTH: NEGATIVE for ear, mouth and throat problems  RESP: NEGATIVE for significant cough or SOB  CV: NEGATIVE for chest pain, palpitations or peripheral  edema  GI: NEGATIVE for nausea, abdominal pain, heartburn, or change in bowel habits  : NEGATIVE for frequency, dysuria, or hematuria  HEME: NEGATIVE for bleeding problems  PSYCHIATRIC: NEGATIVE for changes in mood or affect       Objective   Reported vitals:  There were no vitals taken for this visit.   healthy, alert and no distress  PSYCH: Alert and oriented times 3; coherent speech, normal   rate and volume, able to articulate logical thoughts, able   to abstract reason, no tangential thoughts, no hallucinations   or delusions  His affect is normal  RESP: No cough, no audible wheezing, able to talk in full sentences  Remainder of exam unable to be completed due to telephone visits    Diagnostic Test Results:  Labs reviewed in Epic        Assessment/Plan:  `  1. Acute right-sided low back pain, unspecified whether sciatica present  Discussed with patient options available in regards to treatment for acute back pain.  At the present time we will hold on imaging and he is aware of physical therapy options available for him.  I have suggested a trial of a Medrol Dosepak with a muscle relaxant as needed.  Patient has requested a few pain pills for issues of acute pain which I feel is reasonable.  He was advised about the use of the pain medicine in combination with the muscle relaxants and issues of drowsiness and sedation.  He has been advised that if he has increasing symptoms of weakness, bowel or bladder change of which she has none currently that he should follow-up immediately for reevaluation.  - methylPREDNISolone (MEDROL DOSEPAK) 4 MG tablet therapy pack; Follow Package Directions  Dispense: 21 tablet; Refill: 0  - cyclobenzaprine (FLEXERIL) 10 MG tablet; Take 1 tablet (10 mg) by mouth 3 times daily as needed for muscle spasms  Dispense: 21 tablet; Refill: 0  - HYDROcodone-acetaminophen (NORCO) 5-325 MG tablet; Take 1 tablet by mouth every 6 hours as needed for pain  Dispense: 18 tablet; Refill:  0    Return in about 2 weeks (around 5/7/2020) for if symptoms recur or worsen.      Phone call duration:  12 minutes    Santos Jimenez MD    ;

## 2020-05-26 ENCOUNTER — VIRTUAL VISIT (OUTPATIENT)
Dept: PALLIATIVE MEDICINE | Facility: CLINIC | Age: 45
End: 2020-05-26
Payer: COMMERCIAL

## 2020-05-26 DIAGNOSIS — M54.16 LEFT LUMBAR RADICULITIS: ICD-10-CM

## 2020-05-26 DIAGNOSIS — M47.817 LUMBOSACRAL SPONDYLOSIS WITHOUT MYELOPATHY: Primary | ICD-10-CM

## 2020-05-26 DIAGNOSIS — M51.360 DISCOGENIC LOW BACK PAIN: ICD-10-CM

## 2020-05-26 PROCEDURE — 99204 OFFICE O/P NEW MOD 45 MIN: CPT | Mod: 95 | Performed by: PHYSICAL MEDICINE & REHABILITATION

## 2020-05-26 ASSESSMENT — PAIN SCALES - GENERAL: PAINLEVEL: MILD PAIN (2)

## 2020-05-26 NOTE — PATIENT INSTRUCTIONS
Thank you for coming to [facility] for your care. It is my goal to partner with you to help you reach your optimal state of health.     You were seen today for your back pain. As discussed during your visit these are the recommendations:    1. Medications:  - You can continue using Ibuprofen as needed. Do not take more than 2,400 mg per day.   - Continue over the counter CBD oil as you have been.     2. Order placed for a lumbar epidural steroid injection. You will be called to schedule that.     3. I will follow up with you in clinic 2-3 weeks after the injection to discuss other options if pain is still persistent.     Tiara Montesinos MD  [facility]     ----------------------------------------------------------------  Clinic Number:  [phone]     Call with any questions about your care and for scheduling assistance.     Calls are returned Monday through Friday between 8 AM and 4:30 PM. We usually get back to you within 2 business days depending on the issue/request.    If we are prescribing your medications:    For opioid medication refills, call the clinic or send a [email or web address] message 7 days in advance.  Please include:    Name of requested medication    Name of the pharmacy.    For non-opioid medications, call your pharmacy directly to request a refill. Please allow 3-4 days to be processed.     Per [place] State Law:    All controlled substance prescriptions must be filled within 30 days of being written.      For those controlled substances allowing refills, pickup must occur within 30 days of last fill.      We believe regular attendance is key to your success in our program!      Any time you are unable to keep your appointment we ask that you call us at least 24 hours in advance to cancel.This will allow us to offer the appointment time to another patient.     Multiple missed appointments may lead to dismissal from the clinic.

## 2020-05-26 NOTE — PROGRESS NOTES
The patient has been notified of following:     This telephone visit will be conducted via a call between you and your provider. We have found that certain health care needs can be provided without the need for a physical exam.  This service lets us provide the care you need with a phone conversation.  If a prescription is necessary we can send it directly to your pharmacy.  If lab work is needed we can place an order for that and you can then stop by our lab to have the test done at a later time. This is a billable service but we do not know the cost at this time.    Patient has given verbal consent for Telephone visit?  Yes    [facility]  [facility] Medical Spine Consultation    Date of visit: 5/26/2020    Primary Care Provider: Dr. Santos Jimenez    Reason for consultation:    Kobe Hanson Jr. is a 44 year old male who is seen in consultation today at the request of his primary care physician, Santos Jimenez     Consultation and Evaluation for: Medical spine evaluation    Review of [place] Prescription Monitoring Program (): Today I have also reviewed the patient's history of controlled substance use, as provided by [place] licensed pharmacies and prescriber dispensers.     Review of Pain Questionnaire: Please see the [facility] health questionnaire, which the patient completed and reviewed with me in detail.    Review of Electronic Chart: Today I have also reviewed available medical information in the patient's medical record at [place] ([facility]), including relevant provider notes, laboratory work, and imaging.     Chief Complaint:    Back pain    Pain history:  Kobe Hanson Jr. is a 44 year old male with no significant past medical history who first started having problems with back pain over 20 years ago when he was in his mid 20's. At the time pain was  "intermittent and resolved with heat and NSAIDs for a few days. In 2018 he was doing yoga and did a pose wrong and felt a pop. Since that time he has had episodes where his \"back goes out\". Most recent episode was one month ago when he bend over to  after his dog. Pain is located in the low back, left > right. Has some radiation into the left buttock but no other radicular symptoms into the lower extremities. Pain is fairly constant but varies in severity. It is dull in quality. It is aggravated mostly by bending activities.       Denies red flags including: bowel or bladder symptoms, fever, chills, saddle anesthesia, profound motor loss, history of cancer, history of immune compromise, weight loss. He does endorse that his left leg has overall less strength than his right leg and has been this way for years.     Current medication treatments include:  Ibuprofen 600 mg q 4 hours prn - H  CBD oil - H    Pain medications are being prescribed by NA.     Previous medication treatments included:  Medrol dose jez - H  Norco - H  Cyclobenzaprine - ?  Icy/hot - NH  aspercreme - NH  Tylenol - NH    Other treatments have included:  Behavioral interventions: None  PT: Yes - has done multiple times. Still doing HEP 5 days per week.   Manual Medicine: yes - was doing prior to COVID - H  Surgeries: None  Acupuncture: None  TENs Unit: Yes - [facility] for transient benefit  Injections: None  Other:   - inversion table - H  - gluten free diet - H    Past Medical History:  HLD    Past Surgical History:  Past Surgical History:   Procedure Laterality Date     VASECTOMY  9/2010     Medications:  Current Outpatient Medications   Medication Sig Dispense Refill     cyclobenzaprine (FLEXERIL) 10 MG tablet Take 1 tablet (10 mg) by mouth 3 times daily as needed for muscle spasms 21 tablet 0     ibuprofen (ADVIL/MOTRIN) 200 MG capsule Take 200 mg by mouth every 4 hours as needed for fever       methylPREDNISolone (MEDROL DOSEPAK) 4 MG tablet " therapy pack Follow Package Directions 21 tablet 0     Allergies:     Allergies   Allergen Reactions     Dust Mite Extract      Mold      Trees      Social History:  Home situation: Lives in [place], [place]. 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   Tobacco use: None  Alcohol use: 10 drinks per week on average  Drug use: none    Family history:  Family History   Problem Relation Age of Onset     Hypertension Father      Diagnostic Tests:  Lumbar MRI completed on 8/26/2019 showed:  FINDINGS:  Alignment is maintained. Bone marrow is within normal limits.  Multilevel mild disc height loss and mild facet arthropathy are  present as detailed below. The conus medullaris and cauda equina are  unremarkable. Paraspinal soft tissues are unremarkable.     Segmental Analysis:      T12-L1:  No significant spinal canal or foraminal stenosis.      L1-L2:  No significant spinal canal or foraminal stenosis.       L2-L3:  No significant spinal canal or foraminal stenosis.       L3-L4:  Disc bulge. Subtle posterior disc T2 hyperintensity is present  suggestive of mild annular fissure. Mild bilateral facet arthropathy.  Mild bilateral foraminal stenosis. Mild spinal canal stenosis.       L4-L5:  Disc bulge. Central disc protrusion is present with  curvilinear disc T2 hyperintensity consistent with annular fissure.  This probably contacts the traversing left L5 nerve root within the  lateral recess (series 5 image 23). Mild bilateral facet arthropathy.   Mild bilateral foraminal stenosis. Mild spinal canal stenosis.     L5-S1:  Disc bulge. Subtle posterior disc T2 hyperintensity is present  suggestive of mild annular fissure. Bulge is in close proximity to the  traversing left S1 nerve root without evidence of displacement (series  5 image 30). Mild bilateral facet arthropathy. Mild bilateral  foraminal stenosis. No significant spinal canal stenosis.                                          "                             IMPRESSION:    Degenerative disc disease at L3-L4, L4-L5, and L5-S1. Central disc  protrusion at L4-L5 with annular fissure. More subtle disc  abnormalities at L3-4 and L5-S1.    Labs:  Creatinine 0.95, GFR >90    Review of Systems:    POSTIVE IN BOLD  GENERAL: fever/chills, fatigue, general unwell feeling, weight gain/loss.  HEAD/EYES:  headache, dizziness, or vision changes.    EARS/NOSE/THROAT:  Nosebleeds, hearing loss, sinus infection, earache, tinnitus.  IMMUNE:  Allergies, cancer, immune deficiency, or infections.  SKIN:  Urticaria, rash, hives  HEME/Lymphatic:   anemia, easy bruising, easy bleeding.  RESPIRATORY:  cough, wheezing, or shortness of breath  CARDIOVASCULAR/Circulation:  Extremity edema, syncope, hypertension, tachycardia, or angina.  GASTROINTESTINAL:  abdominal pain, nausea/emesis, diarrhea, constipation,  hematochezia, or melena.  ENDOCRINE:  Diabetes, steroid use,  thyroid disease or osteoporosis.  MUSCULOSKELETAL: neck pain, back pain, arthralgia, arthritis, or gout.  GENITOURINARY:  frequency, urgency, dysuria, difficulty voiding, hematuria or incontinence.  NEUROLOGIC:  weakness, numbness, paresthesias, seizure, tremor, stroke or memory loss.  PSYCHIATRIC:  depression, anxiety, stress, suicidal thoughts or mood swings.     Assessment:  Kobe Hanson Jr. is a 44 year old male with no significant past medical history presents with complaints of chronic back pain.     1. Chronic low back pain: Onset of pain about 20 years ago without any inciting event. Pain is generally constant now and has periods where his \"back goes out\". Pain is worse with bending activities. Some radiation into the left buttock. MRI shows disc bulges at L3-4, L4-5 and L5-S1 with disc material contacting the left L5 nerve root at L4-5 and the left S1 nerve root at L5-S1. There is mild facet arthropathy at these levels. Etiology of pain is likely multifactorial secondary to discogenic " pain, radiculitis and potentially some contribution from mild facet arthropathy.       Plan:  Diagnosis reviewed, treatment options addressed, and risks/benefits discussed. The patient was involved in shared decision making regarding the plan as laid out below.    1. Education: The patient was educated as to the natural history of their disorder. Reassurance was given and the patient was encouraged to engage in activity and movement as able.  2. Physical Therapy:   He should continue with his HEP for now. Can consider doing Med X program in future.   3. Diagnostic Studies:  No additional imaging needed.   4. Medication Management:    1. Continue Ibuprofen PRN, not exceeding 2,400 mg daily  2. Continue OTC CBD/Hemp if helpful  5. Further procedures recommended: Order placed for L4-5 ILESI  6. Complementary treatments: None at this time. Can consider trial of acupuncture. Not discussed today.   7. Follow up: 2-3 weeks after injection    Tiara Montesinos MD  [facility]    Phone call duration: 45 minutes

## 2020-05-27 ENCOUNTER — TELEPHONE (OUTPATIENT)
Dept: PALLIATIVE MEDICINE | Facility: CLINIC | Age: 45
End: 2020-05-27

## 2020-05-27 NOTE — TELEPHONE ENCOUNTER
"Screening Questions for Radiology Injections:    Injection to be done at which interventional clinic site? [facility]    Instruct patient to arrive as directed prior to the scheduled appointment time:    [place] minutes before      [place]: 30 minutes before; if IV needed 1 hour before     Dr. Kim-no IV needed for Cervical ALFREDO; please instruct to arrive 30\" early    Procedure ordered by Dr. Montesinos    Procedure ordered? L4-5 ILESI    As a reminder, receiving steroids can decrease your body's ability to fight infection.   Would you still like to move forward with scheduling the injection?  Yes      Transforaminal Cervical ALFREDO - no pain provider currently performing    What insurance would patient like us to bill for this procedure? BCBS      Worker's comp or MVA (motor vehicle accident) -Any injection DO NOT SCHEDULE and route to Michelle Anirudh.      [employer] insurance - For SI joint injections, DO NOT SCHEDULE and route Michelle Oleary.       [employer] - Any injection besides hip/shoulder/knee joint DO NOT SCHEDULE and route to Michelle Oleary. She will obtain PA and call pt back to schedule procedure or notify pt of denial.       [employer]-Route to [place] for review      **BCBS- ALL need to be routed to [place] for review if a PA is needed**      IF SCHEDULING IN [place] AND NEEDS A PA, IT IS OKAY TO SCHEDULE. [place] HANDLES THEIR OWN PA'S AFTER THE PATIENT IS SCHEDULED. PLEASE SCHEDULE AT LEAST 1 WEEK OUT SO A PA CAN BE OBTAINED.    Any chance of pregnancy? Not Applicable   If YES, do NOT schedule and route to RN pool    Is an  needed? No     Patient has a drive home? (mandatory) YES: Informed    Is patient taking any blood thinners (i.e. plavix, coumadin, jantoven, warfarin, heparin, pradaxa or dabigatran, etc)? No   If hold needed, do NOT schedule, route to RN pool     Is patient taking any aspirin products (includes Excedrin and Fiorinal)? No     If more than 325mg/day, OK to schedule; " Instruct pt to decrease to less than 325 mg for 7 days AND route to RN pool    For CERVICAL procedures, hold all aspirin products for 6 days.     Tell pt that if aspirin product is not held for 6 days, the procedure WILL BE cancelled.      Does the patient have a bleeding or clotting disorder? No     If YES, okay to schedule AND route to RN nurse pool    For any patients with platelet count <100, must be forwarded to provider    Is patient diabetic?  No  If YES, instruct them to bring their glucometer.    Does patient have an active infection or treated for one within the past week? No     Is patient currently taking any antibiotics?  No     For patients on chronic, preventative, or prophylactic antibiotics, procedures may be scheduled.     For patients on antibiotics for active or recent infection:antibiotic course must have been completed for 4 days    Is patient currently taking any steroid medications? (i.e. Prednisone, Medrol)  No     For patients on steroid medications, course must have been completed for 4 days    Is patient actively being treated for cancer or immunocompromised? No  If YES, do NOT schedule and route to RN pool     Are you able to get on and off an exam table with minimal or no assistance? Yes  If NO, do NOT schedule and route to RN pool    Are you able to roll over and lay on your stomach with minimal or no assistance? Yes  If NO, do NOT schedule and route to RN pool     Any allergies to contrast dye, iodine, shellfish, or numbing and steroid medications? No  If YES, route to RN pool AND add allergy information to appointment notes    Allergies: Dust mite extract; Mold; and Trees      Has the patient had a flu shot or any other vaccinations within 7 days before or after the procedure.  No     Does patient have an MRI/CT?  YES: 2020  Check Procedure Scheduling Grid to see if required.      Was the MRI done within the last 3 years?  Yes    If yes, where was the MRI done i.e.[facility],  [facility], [facility], [facility] etc? [facility]      If no, do not schedule and route to RN pool    If [facility], [facility] do NOT schedule and route to RN pool.      If pt has an imaging disc, the injection MAY be scheduled but pt has to bring disc to appt.     If they show up without the disc the injection cannot be done    Procedure Specific Instructions:      If celiac plexus block, informed patient NPO for 6 hours and that it is okay to take medications with sips of water, especially blood pressure medications  Not Applicable         If this is for a cervical procedure, informed patient that aspirin needs to be held for 6 days.   Not Applicable      If IV needed:    Do not schedule procedures requiring IV placement in the first appointment of the day or first appointment after lunch. Do NOT schedule at 0745, 0815 or 1245.     Instructed pt to arrive 30 minutes early for IV start if required. (Check Procedure Scheduling Grid)  Not Applicable    Reminders:      If you are started on any steroids or antibiotics between now and your appointment, you must contact us because the procedure may need to be cancelled.  Yes      For all procedures except radiofrequency ablations (RFAs) and spinal cord stimulator (SCS) trials, informed patient:    IV sedation is not provided for this procedure.  If you feel that an oral anti-anxiety medication is needed, you can discuss this further with your referring provider or primary care provider.  The Pain Clinic provider will discuss specifics of what the procedure includes at your appointment.  Most procedures last 10-20 minutes.  We use numbing medications to help with any discomfort during the procedure.  Not Applicable      For patients 85 or older we recommend having an adult stay w/ them for the remainder of the day.       Does the patient have any questions?  NO  Lucila Willis  [facility]

## 2020-05-27 NOTE — TELEPHONE ENCOUNTER
Authorization required. Unable to to submit through [employer] and [facility] has no representatives to take authorization calls over the phone. Will route to Michelle to try later.        Lucila SHIRLEY    [facility]

## 2020-06-05 NOTE — TELEPHONE ENCOUNTER
Called Kobe. Informed him his insurance his stipulations they want done in order to approve an epidural injection.  Scheduled follow up with Dr Montesinos for Wednesday June 10th at 0830.   Last time he did PT, chiropractor was about a year. He does daily exercises. He takes  Ibuproen 600mg-1200 mg  daily, cbd oil daily.  Once he has his appointment with evaluation, we will let Michelle know so she can submit to insurance.    Claudette Henson RN  Care Coordinator  [facility]

## 2020-06-05 NOTE — TELEPHONE ENCOUNTER
CMM-200.4: Indications: Epidural Steroid Injections (Transforaminal,  Interlaminar, or Caudal)    An epidural steroid injection (ALFREDO) is considered medically necessary for ANY of the Following:    o -Treatment of presumed radiculopathy when there has been failure of at least six (6) weeks of conservative treatment (e.g., exercise, physical methods including physical therapy and/or chiropractic care, nonsteroidal anti-inflammatory drugs [NSAID's] and/or muscle relaxants).    o Treatment of presumed radiculitis or radicular pain when ALL of the following Criteria are met:    - Radicular pain, with or without motor weakness, which follows a specified dermatomal distribution of an involved named spinal root(s)    - A positive straight leg raise, crossed leg raise, and/or Spurling's    - Failure of at least six (6) weeks of conservative treatment (e.g., exercise, physical methods including physical therapy and/or chiropractic care, NSAID's and/or muscle relaxants).        An initial trial when there is evidence of symptomatic spinal stenosis and ALL of the following criteria are met:     o Diagnostic evaluation has ruled out other potential causes of pain     o MRI or CT with or without Myelography within the past twelve (12) months demonstrates moderate to severe spinal stenosis at the level to be treated    o Significant functional limitations resulting in diminished quality of life and impaired, age-appropriate activities of daily living.    o Failure of at least four (4) weeks of conservative treatment (e.g., exercise physical methods including physical therapy and/or chiropractic care, NSAIDS, and/or muscle relaxants)      Advanced diagnostic imaging studies (e.g., MRI CT, CT myelogram) confirm compression or displacement of the corresponding nerve root by a facet joint synovial cyst          Routing to determine if criteria has been met. Note states patient has done PT but do not see any PT notes. He may need to do  PT again if it has been a while

## 2020-06-10 ENCOUNTER — OFFICE VISIT (OUTPATIENT)
Dept: PALLIATIVE MEDICINE | Facility: CLINIC | Age: 45
End: 2020-06-10
Payer: COMMERCIAL

## 2020-06-10 VITALS — DIASTOLIC BLOOD PRESSURE: 94 MMHG | SYSTOLIC BLOOD PRESSURE: 138 MMHG | OXYGEN SATURATION: 98 % | HEART RATE: 82 BPM

## 2020-06-10 DIAGNOSIS — M54.16 LEFT LUMBAR RADICULITIS: Primary | ICD-10-CM

## 2020-06-10 PROCEDURE — 99213 OFFICE O/P EST LOW 20 MIN: CPT | Mod: 95 | Performed by: PHYSICAL MEDICINE & REHABILITATION

## 2020-06-10 ASSESSMENT — PAIN SCALES - GENERAL: PAINLEVEL: MILD PAIN (3)

## 2020-06-10 NOTE — TELEPHONE ENCOUNTER
Authorization Number: [id number]  Review Date: 6/10/2020 12:05:35 PM  Expiration Date: 12/7/2020  Status: Your case has been Approved.        TFLESI approved, okay to schedule      Michelle QUEEN    [facility]

## 2020-06-10 NOTE — PATIENT INSTRUCTIONS
----------------------------------------------------------------  [facility] Number:  [phone]     Call with any questions about your care and for scheduling assistance.     Calls are returned Monday through Friday between 8 AM and 4:30 PM. We usually get back to you within 2 business days depending on the issue/request.    If we are prescribing your medications:    For opioid medication refills, call the clinic or send a [employer] message 7 days in advance.  Please include:    Name of requested medication    Name of the pharmacy.    For non-opioid medications, call your pharmacy directly to request a refill. Please allow 3-4 days to be processed.     Per [place] State Law:    All controlled substance prescriptions must be filled within 30 days of being written.      For those controlled substances allowing refills, pickup must occur within 30 days of last fill.      We believe regular attendance is key to your success in our program!      Any time you are unable to keep your appointment we ask that you call us at least 24 hours in advance to cancel.This will allow us to offer the appointment time to another patient.     Multiple missed appointments may lead to dismissal from the clinic.

## 2020-06-10 NOTE — PROGRESS NOTES
[facility] Medical Spine Follow Up    Date of visit: 6/10/2020    Primary Care Provider: Dr. Santos Jimenez    Review of [place] Prescription Monitoring Program (): Today I have also reviewed the patient's history of controlled substance use, as provided by [place] licensed pharmacies and prescriber dispensers.     Review of Electronic Chart: Today I have also reviewed available medical information in the patient's medical record at [place] ([facility]), including relevant provider notes, laboratory work, and imaging.     Chief Complaint:    Back pain    Interval History:  Last seen on 5/26/2020 for a virtual visit.    Recommendations/plan at the last visit included:  1. Education: The patient was educated as to the natural history of their disorder. Reassurance was given and the patient was encouraged to engage in activity and movement as able.  2. Physical Therapy:   He should continue with his HEP for now. Can consider doing Med X program in future.   3. Diagnostic Studies:  No additional imaging needed.   4. Medication Management:    1. Continue Ibuprofen PRN, not exceeding 2,400 mg daily  2. Continue OTC CBD/Hemp if helpful  5. Further procedures recommended: Order placed for L4-5 ILESI  6. Complementary treatments: None at this time. Can consider trial of acupuncture. Not discussed today.   7. Follow up: 2-3 weeks after injection    Since his last visit, Kobe Hanson Jr. reports:  - Saqib came in to the office today to perform a physical exam to evaluate for a potential procedure.   - No change in symptoms since last visit.   - He states today that the majority of his pain is on the left side of the back and into the buttock and when more severe can travel down the posterior aspect of the leg.   - He does have pain on the right side but thinks it is due to compensating for the left.   - He has done a lot of PT on the past and continues to do a regular exercise program.     Current medication  treatments include:  Ibuprofen 600 mg q 4 hours prn - H  CBD oil - H    Pain medications are being prescribed by NA.    Previous medication treatments included:  Medrol dose jez - H  Norco - H  Cyclobenzaprine - ?  Icy/hot - NH  aspercreme - NH  Tylenol - NH    Other treatments have included:   Behavioral interventions: None  PT: Yes - has done multiple times. Still doing HEP 5 days per week.   Manual Medicine: yes - was doing prior to COVID - H  Surgeries: None  Acupuncture: None  TENs Unit: Yes - [facility] for transient benefit  Injections: None  Other:   - inversion table - H  - gluten free diet - H    Past Medical History:  No past medical history on file.     Past Surgical History:  Past Surgical History:   Procedure Laterality Date     VASECTOMY  9/2010     Medications:  Current Outpatient Medications   Medication Sig Dispense Refill     cyclobenzaprine (FLEXERIL) 10 MG tablet Take 1 tablet (10 mg) by mouth 3 times daily as needed for muscle spasms (Patient not taking: Reported on 5/26/2020) 21 tablet 0     ibuprofen (ADVIL/MOTRIN) 200 MG capsule Take 200 mg by mouth every 4 hours as needed for fever       methylPREDNISolone (MEDROL DOSEPAK) 4 MG tablet therapy pack Follow Package Directions (Patient not taking: Reported on 5/26/2020) 21 tablet 0     Allergies:     Allergies   Allergen Reactions     Dust Mite Extract      Mold      Trees      Social History:  Home situation: Lives in [place], [place]. 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   Tobacco use: None  Alcohol use: 10 drinks per week on average  Drug use: none    Family history:  Family History   Problem Relation Age of Onset     Hypertension Father      Diagnostic Tests:  Lumbar MRI completed on 8/26/2019 showed:  FINDINGS:  Alignment is maintained. Bone marrow is within normal limits.  Multilevel mild disc height loss and mild facet arthropathy are  present as detailed below. The conus medullaris and cauda  equina are  unremarkable. Paraspinal soft tissues are unremarkable.     Segmental Analysis:      T12-L1:  No significant spinal canal or foraminal stenosis.      L1-L2:  No significant spinal canal or foraminal stenosis.       L2-L3:  No significant spinal canal or foraminal stenosis.       L3-L4:  Disc bulge. Subtle posterior disc T2 hyperintensity is present  suggestive of mild annular fissure. Mild bilateral facet arthropathy.  Mild bilateral foraminal stenosis. Mild spinal canal stenosis.       L4-L5:  Disc bulge. Central disc protrusion is present with  curvilinear disc T2 hyperintensity consistent with annular fissure.  This probably contacts the traversing left L5 nerve root within the  lateral recess (series 5 image 23). Mild bilateral facet arthropathy.   Mild bilateral foraminal stenosis. Mild spinal canal stenosis.     L5-S1:  Disc bulge. Subtle posterior disc T2 hyperintensity is present  suggestive of mild annular fissure. Bulge is in close proximity to the  traversing left S1 nerve root without evidence of displacement (series  5 image 30). Mild bilateral facet arthropathy. Mild bilateral  foraminal stenosis. No significant spinal canal stenosis.                                                                      IMPRESSION:    Degenerative disc disease at L3-L4, L4-L5, and L5-S1. Central disc  protrusion at L4-L5 with annular fissure. More subtle disc  abnormalities at L3-4 and L5-S1.    Labs:  Creatinine 0.95, GFR >90    Review of Systems:    POSTIVE IN BOLD  GENERAL: fever/chills, fatigue, general unwell feeling, weight gain/loss.  HEAD/EYES:  headache, dizziness, or vision changes.    EARS/NOSE/THROAT:  Nosebleeds, hearing loss, sinus infection, earache, tinnitus.  IMMUNE:  Allergies, cancer, immune deficiency, or infections.  SKIN:  Urticaria, rash, hives  HEME/Lymphatic:   anemia, easy bruising, easy bleeding.  RESPIRATORY:  cough, wheezing, or shortness of breath  CARDIOVASCULAR/Circulation:   "Extremity edema, syncope, hypertension, tachycardia, or angina.  GASTROINTESTINAL:  abdominal pain, nausea/emesis, diarrhea, constipation,  hematochezia, or melena.  ENDOCRINE:  Diabetes, steroid use,  thyroid disease or osteoporosis.  MUSCULOSKELETAL: neck pain, back pain, arthralgia, arthritis, or gout.  GENITOURINARY:  frequency, urgency, dysuria, difficulty voiding, hematuria or incontinence.  NEUROLOGIC:  weakness, numbness, paresthesias, seizure, tremor, stroke or memory loss.  PSYCHIATRIC:  depression, anxiety, stress, suicidal thoughts or mood swings.     Physical Exam:  Vitals:    06/10/20 0834   BP: (!) 138/94   Pulse: 82   SpO2: 98%     Exam:  Constitutional: healthy, alert, active and no distress  Head: normocephalic. Atraumatic.   Eyes: no redness or jaundice noted   ENT: oropharnx normal.  MMM.  Neck supple.    Respiratory: breathing unlabored on room air  Gastrointestinal: soft, non-tender  : deferred  Skin: no suspicious lesions or rashes  Psychiatric: mentation appears normal and affect normal/bright    Musculoskeletal exam:  Gait/Station/Posture: Normal gait and posture.   Thoracic spine:  Normal   Lumbar spine: ROM restricted in flexion and normal in extension  Myofascial tenderness:  None  Straight leg exam: positive on left  SI Joint Testing: negative  Greater trochanteric tenderness: negative    Neurologic exam:  Motor:  5/5 LE strength  Reflexes:     Patella:  R:  2/4 L: 2/4   Achilles:  R:  2/4 L: 2/4   Sensory:  (lower extremities):   Light touch: normal    Allodynia: absent    Hyperalgesia: absent     Assessment:  Kobe YANIQUE Margie  is a 44 year old male with no significant past medical history presents with complaints of chronic back pain.      1. Chronic low back pain: Onset of pain about 20 years ago without any inciting event. Pain is generally constant now and has periods where his \"back goes out\". Pain is worse with bending activities. Majority of the pain is on the leeft side. " Located in low back and radiates into the buttock and more distally into the posterior aspect of the leg when more severe. Exam shows positive SLR on left. MRI shows disc bulges at L3-4, L4-5 and L5-S1 with disc material contacting the left L5 nerve root at L4-5 and the left S1 nerve root at L5-S1. There is mild facet arthropathy at these levels. His symptoms correlate with left S1 radiculitis. Etiology of pain is likely multifactorial secondary to lumbar radiculitis, discogenic pain and potentially some contribution from mild facet arthropathy.     Plan:  1. Physical Therapy:   He should continue with his HEP for now.  2. Diagnostic Studies:  No additional imaging needed.   3. Medication Management:    1. Continue Ibuprofen PRN, not exceeding 2,400 mg daily  2. Continue OTC CBD/Hemp if helpful  4. Further procedures recommended: Due to the majority of pain being on the left side with radicular symptoms in left S1 distribution will change the previous ALFREDO order to a left L5-S1 Transforaminal epidural steroid injection. If this does not provide significant improvement will consider L4-5 ILESI as the next option.   5. Complementary treatments: None at this time. Can consider trial of acupuncture.   6. Follow up: 2-3 weeks after injection    Tiara Montesinos MD  [facility] Pain Management      Total time spent face to face was 10 minutes and more than 50% of face to face time was spent in coordination of care regarding the diagnosis and recommendations

## 2020-06-15 NOTE — TELEPHONE ENCOUNTER
06/29/2020 LESI Injection with Dr Yamel Henson RN  Care Coordinator  [facility] Pain [facility]

## 2020-06-28 NOTE — PROGRESS NOTES
[facility] - Procedure Note    Date of Service: 6/29/2020    Procedure performed: Left L5-S1 transforaminal epidural steroid injection with fluoroscopic guidance  Diagnosis: Lumbar spondylosis; Lumbar radiculitis/radiculopathy  : Tiara Montesinos MD   Anesthesia: None  Complications: None    Indications: Kobe Hanson Jr. is a 44 year old male who is seen by me presents today for lumbar transforaminal epidural steroid injection. The patient describes left > right sided back pain with radiation into the posterior left leg. Exam shows positive SLR on left. The patient has been exhibiting symptoms consistent with lumbar intraspinal inflammation and radiculopathy. Symptoms have been persistent, disabling, and intermittently severe. The patient reports minimal improvement with conservative treatment, including medications and physical therapy.     Lumbar MRI was done on 8/6/2019 which showed   FINDINGS:  Alignment is maintained. Bone marrow is within normal limits.  Multilevel mild disc height loss and mild facet arthropathy are  present as detailed below. The conus medullaris and cauda equina are  unremarkable. Paraspinal soft tissues are unremarkable.     Segmental Analysis:      T12-L1:  No significant spinal canal or foraminal stenosis.      L1-L2:  No significant spinal canal or foraminal stenosis.       L2-L3:  No significant spinal canal or foraminal stenosis.       L3-L4:  Disc bulge. Subtle posterior disc T2 hyperintensity is present  suggestive of mild annular fissure. Mild bilateral facet arthropathy.  Mild bilateral foraminal stenosis. Mild spinal canal stenosis.       L4-L5:  Disc bulge. Central disc protrusion is present with  curvilinear disc T2 hyperintensity consistent with annular fissure.  This probably contacts the traversing left L5 nerve root within the  lateral recess (series 5 image 23). Mild bilateral facet arthropathy.   Mild bilateral foraminal stenosis.  Mild spinal canal stenosis.     L5-S1:  Disc bulge. Subtle posterior disc T2 hyperintensity is present  suggestive of mild annular fissure. Bulge is in close proximity to the  traversing left S1 nerve root without evidence of displacement (series  5 image 30). Mild bilateral facet arthropathy. Mild bilateral  foraminal stenosis. No significant spinal canal stenosis.                                                                      IMPRESSION:    Degenerative disc disease at L3-L4, L4-L5, and L5-S1. Central disc  protrusion at L4-L5 with annular fissure. More subtle disc  abnormalities at L3-4 and L5-S1.    Allergies:      Allergies   Allergen Reactions     Dust Mite Extract      Mold      Trees         Vitals:  BP (!) 138/90   Pulse 74   SpO2 98%     Review of Systems: The patient denies recent fever, chills, illness, use of antibiotics or anticoagulants. All other 10-point review of systems negative.     Procedure: The procedure and risks were explained, and informed written consent was obtained from the patient. Risks include but are not limited to: infection, bleeding, increased pain, and damage to soft tissue, nerve, muscle, and vasculature structures. After getting informed consent, patient was brought into the procedure suite and was placed in a prone position on the procedure table. A Pause for the Cause was performed. Patient was prepped and draped in sterile fashion.     After identifying the left L5-S1 neuroforamen, the C-arm was rotated to a left lateral oblique angle.  A total of 3 mL of Lidocaine 1% was used to anesthetize the skin and the needle track at a skin entry site coaxial with the fluoroscopy beam, and overriding the superior aspect of the neuroforamen.  A 22 gauge 5 inch spinal needle was advanced under intermittent fluoroscopy until it entered the foramen superiorly.    The position was then inspected from anteroposterior and lateral views, and the needle adjusted appropriately.  After  negative aspiration, a total of 0.5 mL of Omnipaque-300 was injected using static and continuous fluoroscopy confirming appropriate position, with spread along the nerve root sheath and into the epidural space, with no intravascular or intrathecal uptake. 9.5 mL of Omnipaque-300 was wasted.    2 mL of 1% lidocaine with 10 mg of dexamethasone was injected.  The needle was removed. Hemostasis was achieved, the area was cleaned, and bandaids were placed when appropriate. Images were saved to PACS.    The patient tolerated the procedure well, and was taken to the recovery room, and there was no evidence of procedural complications. No new sensory or motor deficits were noted following the procedure. The patient was stable and able to ambulate on discharge home. Post-procedure instructions were provided.     Pre-procedure pain score: 2/10 in the back, 2/10 in the leg  Post-procedure pain score: 2/10 in the back, 2/10 in the leg    Assessment/Plan: Kobe Hanson Jr. is a 44 year old male s/p left L5-S1 transforaminal epidural steroid injection today for lumbar spondylosis, radiculitis/radiculopathy.     1. Following today's procedure, the patient was advised to contact the [facility] for any of the following:   Fever, chills, or night sweats   New onset of pain, numbness, or weakness   Any questions/concerns regarding the procedure  If unable to contact the Pain Center, the patient was instructed to go to a local Emergency Room for any complications.   2. The patient will receive a follow-up call in 1 week.  3. The patient should follow-up with me in 2-3 weeks for post-procedure evaluation.      Tiara Montesinos MD  [facility] Management [place]

## 2020-06-29 ENCOUNTER — RADIOLOGY INJECTION OFFICE VISIT (OUTPATIENT)
Dept: PALLIATIVE MEDICINE | Facility: CLINIC | Age: 45
End: 2020-06-29
Payer: COMMERCIAL

## 2020-06-29 ENCOUNTER — ANCILLARY PROCEDURE (OUTPATIENT)
Dept: GENERAL RADIOLOGY | Facility: CLINIC | Age: 45
End: 2020-06-29
Attending: PHYSICAL MEDICINE & REHABILITATION
Payer: COMMERCIAL

## 2020-06-29 VITALS — HEART RATE: 67 BPM | OXYGEN SATURATION: 99 % | DIASTOLIC BLOOD PRESSURE: 93 MMHG | SYSTOLIC BLOOD PRESSURE: 144 MMHG

## 2020-06-29 DIAGNOSIS — M54.16 LEFT LUMBAR RADICULITIS: Primary | ICD-10-CM

## 2020-06-29 DIAGNOSIS — M54.16 LUMBAR RADICULOPATHY: ICD-10-CM

## 2020-06-29 PROCEDURE — 64483 NJX AA&/STRD TFRM EPI L/S 1: CPT | Mod: LT | Performed by: PHYSICAL MEDICINE & REHABILITATION

## 2020-06-29 NOTE — NURSING NOTE
Discharge Information    IV Discontiued Time:  NA    Amount of Fluid Infused:  NA    Discharge Criteria = When patient returns to baseline or as per MD order    Consciousness:  Pt is fully awake    Circulation:  BP +/- 20% of pre-procedure level    Respiration:  Patient is able to breathe deeply    O2 Sat:  Patient is able to maintain O2 Sat >92% on room air    Activity:  Moves 4 extremities on command    Ambulation:  Patient is able to stand and walk or stand and pivot into wheelchair    Dressing:  Clean/dry or No Dressing    Notes:   Discharge instructions and AVS given to patient    Patient meets criteria for discharge?  YES    Admitted to PCU?  No    Responsible adult present to accompany patient home?  Yes    Signature/Title:    Claudette Henson RN Care Coordinator  [facility] Management [place]

## 2020-06-29 NOTE — NURSING NOTE
Pre-procedure Intake    Have you been fasting? No    If yes, for how long?     Are you taking a prescribed blood thinner such as coumadin, Plavix, Xarelto?    No    If yes, when did you take your last dose?     Do you take aspirin?   NO    If cervical procedure, have you held aspirin for 6 days?   NA    Do you have any allergies to contrast dye, iodine, steroid and/or numbing medications?  NO    Are you currently taking antibiotics or have an active infection?  NO    Have you had a fever/elevated temperature within the past week? NO    Are you currently taking oral steroids? NO    Do you have a ? Yes       Are you pregnant or breastfeeding?  NO    Are the vital signs normal?  Yes

## 2020-06-29 NOTE — PATIENT INSTRUCTIONS
[facility] Procedure Discharge Instructions    Today you saw:   Dr. Tiara Montesinos       Your procedure:  Epidural steroid injection      Medications used:  Lidocaine (anesthetic) Dexamethasone (steroid)       Omnipaque (contrast)               Be cautious when walking as numbness and/or weakness in the legs may occur up to 6-8 hours after the procedure due to effect of the local anesthetic    Do not drive for 6 hours. The effect of the local anesthetic could slow your reflexes.     Avoid strenuous activity for the first 24 hours. You may resume your regular activities after that.     You may shower, however avoid swimming, tub baths or hot tubs for 24 hours following your procedure    You may have a mild to moderate increase in pain for several days following the injection.      You may use ice packs for 10-15 minutes, 3 to 4 times a day at the injection site for comfort    Do not use heat to painful areas for 6 to 8 hours. This will give the local anesthetic time to wear off and prevent you from accidentally burning your skin.    Unless you have been directed to avoid the use of anti-inflammatory medications (NSAIDS-ibuprofen, Aleve, Motrin), you may use these medications or Tylenol for pain control if needed.     With diabetes, check your blood sugar more frequently than usual as your blood sugar may be higher than normal for 10-14 days following a steroid injection. Contact your doctor who manages your diabetes if your blood sugar is higher than usual    Possible side effects of steroids that you may experience include flushing, elevated blood pressure, increased appetite, mild headaches and restlessness.  All of these symptoms will get better with time.    It may take up to 14 days for the steroid medication to start working although you may feel the effect as early as a few days after the procedure.     Follow up with your referring provider in 2-3 weeks      If you experience any of the  following, call the pain center line during work hours at [phone] or on-call physician after hours at [phone]:  -Fever over 100 degree F  -Swelling, bleeding, redness, drainage, warmth at the injection site  -Progressive weakness or numbness in your legs   -Loss of bowel or bladder function  -Unusual headache that is not relieved by Tylenol or your regular headache medication  -Unusual new onset of pain that is not improving

## 2020-08-11 ENCOUNTER — MYC MEDICAL ADVICE (OUTPATIENT)
Dept: PALLIATIVE MEDICINE | Facility: CLINIC | Age: 45
End: 2020-08-11

## 2020-08-11 NOTE — TELEPHONE ENCOUNTER
Will leave encounter open for patient response/chart review by nursing.     Trice Cerrato,     To clarify, steroids last on average about 3 months for most people. There is never a guarantee that they will work for any exact time. We do not charge based on expectation of something that is unpredictable. Charges are placed based on services provided. Chronic pain is certainly challenging and management of this does take some time as there is no easy solution.  With that being said, if you have concerns about your billing you would need to contact the billing department. I believe the number for [facility] billing is [phone].   If you would like to discuss possible next steps I would recommend that you make an appointment to discuss with Dr. Montesinos. It looks like when you saw her in clinic the plan was to follow up with in clinic 2-3 weeks after the injection to discuss other options if pain was still persistent. Our scheduling department is [phone]. Thanks    Arpita RAMOS, RN Care Coordinator  [facility]  Pain Management    ------below from pt-----------    Hi Dr. Montesinos,     I did not/am not having a very good experience with the steroid injection that you performed.  The steroid injection took about a week to start working, it worked great for about 3 weeks, I felt totally normal and was pain free; then it stopped suddenly.  By the end of July I was back in as much pain on a constant basis as I was before.  I thought perhaps it was due to more exercising, so I haven't done much exercise at all the past week and I'm still in pain, sometimes with sharp pains down my leg and even tingling in my feet when hiking on vacation at the end of July.  I have a bill due for $675 and I'm hesitant to pay it because the results are nowhere near what was expected with this procedure.     What is the next step in the plan to get rid of this pain and constant inflammation?  Also, what can be done to reduce the  charges to 1/3 of the original since i got about 1 month of comfort rather than the explained 3 months.     I'm available anytime via cell phone at [phone].     Thank you,  Saqib

## 2020-08-17 NOTE — TELEPHONE ENCOUNTER
maxwell bhat and pt has follow up appt.    Arpita RAMOS, RN Care Coordinator  [facility]  Pain [facility]

## 2020-08-18 NOTE — PROGRESS NOTES
"Kobe Hanson Jr. is a 44 year old male who is being evaluated via a billable video visit.      The patient has been notified of following:     \"This video visit will be conducted via a call between you and your physician/provider. We have found that certain health care needs can be provided without the need for an in-person physical exam.  This service lets us provide the care you need with a video conversation.  If a prescription is necessary we can send it directly to your pharmacy.  If lab work is needed we can place an order for that and you can then stop by our lab to have the test done at a later time.    Video visits are billed at different rates depending on your insurance coverage.  Please reach out to your insurance provider with any questions.    If during the course of the call the physician/provider feels a video visit is not appropriate, you will not be charged for this service.\"    Patient has given verbal consent for Video visit? Yes  How would you like to obtain your AVS? MyChart  If you are dropped from the video visit, the video invite should be resent to: Text to cell phone: [phone]  Will anyone else be joining your video visit? No      Elle Brooks [facility]  [place]    Video-Visit Details    Type of service:  Video Visit    Video Start Time: 10:10 am  Video End Time: 10:35 am     Originating Location (pt. Location): Home    Distant Location (provider location):  [facility]     Platform used for Video Visit: [facility]    Chief Complaint: Back pain    Interval History:  Last seen on 6/10/2020.        Recommendations/plan at the last visit included:  1. Physical Therapy:   He should continue with his HEP for now.  2. Diagnostic Studies:  No additional imaging needed.   3. Medication Management:    1. Continue Ibuprofen PRN, not exceeding 2,400 mg daily  2. Continue OTC CBD/Hemp if " "helpful  4. Further procedures recommended: Due to the majority of pain being on the left side with radicular symptoms in left S1 distribution will change the previous ALFREDO order to a left L5-S1 Transforaminal epidural steroid injection. If this does not provide significant improvement will consider L4-5 ILESI as the next option.   5. Complementary treatments: None at this time. Can consider trial of acupuncture.   6. Follow up: 2-3 weeks after injection    Since his last visit, Kobe Hanson Jr. reports:  - He had an left L5-S1 Transforaminal epidural steroid injection on 6/29/2020. It took 10 days for the steroid to start working and he had 2-3 weeks of no pain. He felt pain free. By the end of the 3rd week his pain returned to baseline.   - He is continuing to do his regular exercises  - He lost 10 pounds since our last visit and is continuing to focus on improving his nutrition.   - The pain is still mainly on the left side of the low back and into the buttock. He has right sided pain which is more intermittent and thinks it is due to compensating for the pain on the left which is more constant.   - He was using CBD oil which was helpful but stopped 5 days ago because he was feeling \"high\" on it. He feels better now that he is not using it.     Pain Information:   Pain quality: Aching and dull    Pain rating: intensity can get up to 7/10 in severity.     Annual Controlled Substance Agreement: NA  UDS: NA    Current Relevant Pain Medications:  Ibuprofen 600 mg q 4 hrs prn - H    Other Medications:  NA    Previous Medications: (H--helped; HI--Helped initially; SWH-- somewhat helpful, NH--No help; W--worse; SE--side effects)   Medrol dose jez - H  Norco - H  Cyclobenzaprine - ?  Icy/hot - NH  aspercreme - NH  Tylenol - NH  CBD oil - H but SE of \"feeling high\"    Past Pain Treatments:  Behavioral interventions: None  PT: Yes - has done multiple times. Still doing HEP 5 days per week.   Manual Medicine: yes - was " doing prior to COVID - H  Surgeries: None  Acupuncture: None  TENs Unit: Yes - [facility] for transient benefit  Injections: Left L5-S1 Transforaminal epidural steroid injection on 6/29/2020 - H for 2-3 weeks, 100 % relief during that time.   Other:   - inversion table - H  - gluten free diet - H    [place] Board of Pharmacy Data Base Reviewed:    YES; As expected, no concern for misuse/abuse of controlled medications based on this report.    Medications:  Current Outpatient Medications   Medication Sig Dispense Refill     cyclobenzaprine (FLEXERIL) 10 MG tablet Take 1 tablet (10 mg) by mouth 3 times daily as needed for muscle spasms (Patient not taking: Reported on 5/26/2020) 21 tablet 0     ibuprofen (ADVIL/MOTRIN) 200 MG capsule Take 200 mg by mouth every 4 hours as needed for fever       methylPREDNISolone (MEDROL DOSEPAK) 4 MG tablet therapy pack Follow Package Directions (Patient not taking: Reported on 5/26/2020) 21 tablet 0     Review of Systems: A 10-point review of systems was negative, with the exception of chronic pain issues.      Social History: Reviewed; unchanged from previous consultation.      Family history: Reviewed; unchanged from previous consultation.     PHYSICAL EXAM:   Constitutional: healthy, alert and no distress  HEENT: Head atraumatic, normocephalic. Eyes without conjunctival injection or jaundice. Neck supple. No obvious neck masses.  Psychiatric/mental status: Alert, without lethargy or stupor. Appropriate affect. Mood normal.   Neurologic exam:  Motor:  Moves all extremities equally    DIAGNOSTIC TESTS:  Imaging Studies:   Lumbar MRI completed on 8/26/2019 showed:  FINDINGS:  Alignment is maintained. Bone marrow is within normal limits.  Multilevel mild disc height loss and mild facet arthropathy are  present as detailed below. The conus medullaris and cauda equina are  unremarkable. Paraspinal soft tissues are unremarkable.     Segmental Analysis:      T12-L1:  No significant spinal  "canal or foraminal stenosis.      L1-L2:  No significant spinal canal or foraminal stenosis.       L2-L3:  No significant spinal canal or foraminal stenosis.       L3-L4:  Disc bulge. Subtle posterior disc T2 hyperintensity is present  suggestive of mild annular fissure. Mild bilateral facet arthropathy.  Mild bilateral foraminal stenosis. Mild spinal canal stenosis.       L4-L5:  Disc bulge. Central disc protrusion is present with  curvilinear disc T2 hyperintensity consistent with annular fissure.  This probably contacts the traversing left L5 nerve root within the  lateral recess (series 5 image 23). Mild bilateral facet arthropathy.   Mild bilateral foraminal stenosis. Mild spinal canal stenosis.     L5-S1:  Disc bulge. Subtle posterior disc T2 hyperintensity is present  suggestive of mild annular fissure. Bulge is in close proximity to the  traversing left S1 nerve root without evidence of displacement (series  5 image 30). Mild bilateral facet arthropathy. Mild bilateral  foraminal stenosis. No significant spinal canal stenosis.                                                                      IMPRESSION:    Degenerative disc disease at L3-L4, L4-L5, and L5-S1. Central disc  protrusion at L4-L5 with annular fissure. More subtle disc  abnormalities at L3-4 and L5-S1.    Assessment:  Kobe Hanson Jr. is a 44 year old male with no significant past medical history presents with complaints of chronic back pain.      1. Chronic low back pain: Onset of pain about 20 years ago without any inciting event. Pain is generally constant now and has periods where his \"back goes out\". Pain is worse with bending activities. Majority of the pain is on the left side. Located in low back and radiates into the buttock and more distally into the posterior aspect of the leg when more severe. Initial exam showed positive SLR on left. MRI shows disc bulges at L3-4, L4-5 and L5-S1 with disc material contacting the left L5 " nerve root at L4-5 and the left S1 nerve root at L5-S1. There is mild facet arthropathy at these levels. His symptoms correlate with left L5, S1 radiculitis. Etiology of pain is likely multifactorial secondary to lumbar radiculitis, discogenic pain and potentially some contribution from mild facet arthropathy.     Plan:  1. Physical Therapy: He should continue with his HEP for now.  2. Diagnostic Studies: No additional imaging needed.   3. Medication Management:    1. Continue Ibuprofen PRN, not exceeding 2,400 mg daily  2. We briefly discussed addition of gabapentin to help with neuropathic pain. He is hesitant at this time to try medication due to concern of SE of grogginess since he has to be very alert to do his job and make quick decisions.   3. Can also consider nortriptyline or Cymbalta.   4. Further procedures recommended: He had 100% pain relief for 2-3 weeks with a left L5-S1 Transforaminal epidural steroid injection. Pain is now back to baseline. He does not want to try another injection at this time due to the short period of pain relief he had from the last one. It would be reasonable to consider repeating a left L5-S1 Transforaminal epidural steroid injection since he did have significant transient improvement in pain vs trying an L4-5 interlaminar ALFREDO.    5. Referral: Discussed Neurosurgical referral to evaluate if there is potentially a surgical option that would be recommended for him. He is becoming more and more frustrated over time with this pain and would like to pursue a more definitive plan if that is an option.   6. Follow up: He can follow up with me after he meets with Neurosurgery.     Tiara Montesinos MD  [facility] [place]

## 2020-08-19 ENCOUNTER — VIRTUAL VISIT (OUTPATIENT)
Dept: PALLIATIVE MEDICINE | Facility: CLINIC | Age: 45
End: 2020-08-19
Payer: COMMERCIAL

## 2020-08-19 DIAGNOSIS — M54.16 LEFT LUMBAR RADICULITIS: Primary | ICD-10-CM

## 2020-08-19 DIAGNOSIS — M47.817 LUMBOSACRAL SPONDYLOSIS WITHOUT MYELOPATHY: ICD-10-CM

## 2020-08-19 PROCEDURE — 99214 OFFICE O/P EST MOD 30 MIN: CPT | Mod: 95 | Performed by: PHYSICAL MEDICINE & REHABILITATION

## 2020-08-19 ASSESSMENT — PAIN SCALES - GENERAL: PAINLEVEL: MODERATE PAIN (4)

## 2020-08-19 NOTE — PATIENT INSTRUCTIONS
1. Referral placed for you to see Neurosurgery. You should receive a call to schedule that appointment. Here is the number in case you don't get a call: [phone]       2. You can follow up with me if surgery is not recommended and we can discuss medication options and further injection options to help manage pain.     Tiara Montesinos MD  [facility]       ----------------------------------------------------------------  Clinic Number:  [phone]     Call with any questions about your care and for scheduling assistance.     Calls are returned Monday through Friday between 8 AM and 4:30 PM. We usually get back to you within 2 business days depending on the issue/request.    If we are prescribing your medications:    For opioid medication refills, call the clinic or send a HeadCount message 7 days in advance.  Please include:    Name of requested medication    Name of the pharmacy.    For non-opioid medications, call your pharmacy directly to request a refill. Please allow 3-4 days to be processed.     Per [place] State Law:    All controlled substance prescriptions must be filled within 30 days of being written.      For those controlled substances allowing refills, pickup must occur within 30 days of last fill.      We believe regular attendance is key to your success in our program!      Any time you are unable to keep your appointment we ask that you call us at least 24 hours in advance to cancel.This will allow us to offer the appointment time to another patient.     Multiple missed appointments may lead to dismissal from the clinic.

## 2020-08-31 ENCOUNTER — MYC MEDICAL ADVICE (OUTPATIENT)
Dept: PALLIATIVE MEDICINE | Facility: CLINIC | Age: 45
End: 2020-08-31

## 2020-08-31 NOTE — TELEPHONE ENCOUNTER
Byliner message from patient on 08/31/2020 at 1531:  Hi Dr. Montesinos,     When should I expect to hear from the Neurosurgical group?  I haven't seen any messages or received any phone calls yet.     Thank you,  Saqib     [phone]  ________________________________________    Miprotohart message sent to patient:  Good Afternoon Bill.    I am sorry you have not heard from the Neurosurgical group.  Dr Montesinos did place the order on 08/19/2020.  Here is their information. [facility]: [facility] [phone] .  Please give this number a call to set up your appointment.  Thank you for reaching out to us.  I hope you have a great day.  Stay safe and healthy.    Take Care,    Claudette Henson RN  Care Coordinator  [facility]    Routing to provider as an DEZ

## 2020-09-03 NOTE — TELEPHONE ENCOUNTER
chart reviewed- has neurosurgery appt 09/08/20    Arpita RICON, RN Care Coordinator  [facility]  Pain Management

## 2020-09-08 ENCOUNTER — OFFICE VISIT (OUTPATIENT)
Dept: NEUROSURGERY | Facility: CLINIC | Age: 45
End: 2020-09-08
Attending: PHYSICAL MEDICINE & REHABILITATION
Payer: COMMERCIAL

## 2020-09-08 VITALS
HEIGHT: 72 IN | RESPIRATION RATE: 18 BRPM | SYSTOLIC BLOOD PRESSURE: 154 MMHG | WEIGHT: 213 LBS | OXYGEN SATURATION: 99 % | DIASTOLIC BLOOD PRESSURE: 108 MMHG | BODY MASS INDEX: 28.85 KG/M2 | HEART RATE: 89 BPM

## 2020-09-08 DIAGNOSIS — M54.16 LEFT LUMBAR RADICULITIS: Primary | ICD-10-CM

## 2020-09-08 DIAGNOSIS — M70.62 TROCHANTERIC BURSITIS OF LEFT HIP: ICD-10-CM

## 2020-09-08 DIAGNOSIS — M47.817 LUMBOSACRAL SPONDYLOSIS WITHOUT MYELOPATHY: ICD-10-CM

## 2020-09-08 PROCEDURE — 99213 OFFICE O/P EST LOW 20 MIN: CPT | Performed by: PHYSICIAN ASSISTANT

## 2020-09-08 PROCEDURE — G0463 HOSPITAL OUTPT CLINIC VISIT: HCPCS

## 2020-09-08 ASSESSMENT — MIFFLIN-ST. JEOR: SCORE: 1894.16

## 2020-09-08 ASSESSMENT — PAIN SCALES - GENERAL: PAINLEVEL: SEVERE PAIN (6)

## 2020-09-08 NOTE — PROGRESS NOTES
NEUROSURGERY CLINIC CONSULT NOTE     DATE OF VISIT: 9/8/2020     SUBJECTIVE:     Kobe Hanson Jr. is a pleasant 45 year old male who presents to the clinic today for consultation on lumbar spine pain without radiculopathy. He is referred to the Neurosurgery Clinic by Dr. Montesinos in Pain Management.   Today, Mr. Kumari reports a multi-month history of symptoms. He describes a constant ache in his low back with a more bothersome intermittent sharp, burning pain that initiates in the left greater trochanteric region. Again, there is no radicular symptoms following a specific distribution. This pain is not accompanied by paresthesias and numbness. Prolonged walking, standing, and running aggravate the symptoms, while alleviation is obtained by rest and Ibuprofen. No mechanism of injury such as trauma or a fall is associated with the onset of the pain. There are no bowel or bladder changes.   The patient has participated in conservative therapies to include physical therapy, traction, chiropractic care, NSAIDs, a Medrol Dosepak, CBD and an IL epidural steroid injection administered on 06/29 at the L4-5 level. None of these modalities have provided any significant long term relief.         Current Outpatient Medications:      ibuprofen (ADVIL/MOTRIN) 200 MG capsule, Take 200 mg by mouth every 4 hours as needed for fever, Disp: , Rfl:      cyclobenzaprine (FLEXERIL) 10 MG tablet, Take 1 tablet (10 mg) by mouth 3 times daily as needed for muscle spasms (Patient not taking: Reported on 5/26/2020), Disp: 21 tablet, Rfl: 0     methylPREDNISolone (MEDROL DOSEPAK) 4 MG tablet therapy pack, Follow Package Directions (Patient not taking: Reported on 5/26/2020), Disp: 21 tablet, Rfl: 0     Allergies   Allergen Reactions     Dust Mite Extract      Mold      Trees         History reviewed. No pertinent past medical history.     ROS: 10 point review of symptoms are negative other than the symptoms noted above in the HPI.      Family History has been reviewed with the patient, there are no pertinent findings to presenting concern.     Past Surgical History:   Procedure Laterality Date     VASECTOMY  2010        Social History     Tobacco Use     Smoking status: Former Smoker     Last attempt to quit: 2/3/2007     Years since quittin.6     Smokeless tobacco: Never Used   Substance Use Topics     Alcohol use: Yes     Drug use: No        OBJECTIVE:   BP (!) 154/108   Pulse 89   Resp 18   Ht 6' (1.829 m)   Wt 213 lb (96.6 kg)   SpO2 99%   BMI 28.89 kg/m     Body mass index is 28.89 kg/m .     Imaging:     MRI LUMBAR SPINE WITHOUT CONTRAST   2019 11:04 AM     Findings, per radiologist read, notable for:      Degenerative disc disease at L3-L4, L4-L5, and L5-S1. Central disc  protrusion at L4-L5 with annular fissure. More subtle disc  abnormalities at L3-4 and L5-S1.    Full radiological report in chart. Imaging reviewed with with patient today.     Exam:     Patient appears comfortable, conversational, and in no apparent distress.   Head: Normocephalic, without obvious abnormality, atraumatic, no facial asymmetry.   Eyes: conjunctivae/corneas clear. PERRL, EOM's intact.   Throat: lips, mucosa, and tongue normal; teeth and gums normal.   Neck: supple, symmetrical, trachea midline, no adenopathy and thyroid: not enlarged, symmetric, no tenderness/mass/nodules.   Lungs: clear to auscultation bilaterally.   Heart: regular rate and rhythm.   Abdomen: soft, non-tender; bowel sounds normal; no masses, no organomegaly.   Pulses: 2+ and symmetric.   Skin: Skin color, texture, turgor normal. No rashes or lesions.     CN II-XII grossly intact, alert and appropriate with conversation and following commands.   Gait is non-antalgic. Able to tandem walk. Able to walk on toes and heels without difficulty.   Cervical spine is non tender to palpation. Appropriate range of motion of neck, not concerning for lhermitte's phenomenon.    Bilateral bicep 2/4 and tricep reflexes 1/4. Sensation intact throughout upper extremities.     UE muscle strength  Right  Left    Deltoid  5/5  5/5    Biceps  5/5  5/5    Triceps  5/5  5/5    Hand intrinsics  5/5  5/5    Hand grasp  5/5  5/5    Anderson signs  neg  neg      Lumbar spine is non tender to palpation   Intact sensation throughout lower extremities.   Bilateral patellar 2/4 and achilles reflex 1/4. Negative for pain with straight leg raise.     LE muscle strength  Right  Left    Iliopsoas (hip flexion)  5/5  5/5    Quad (knee extension)  5/5  5/5    Hamstring (knee flexion)  5/5  5/5    Gastrocnemius (PF)  5/5  5/5    Tibialis Ant. (DF)  5/5  5/5    EHL  5/5  5/5      Negative Babinski bilaterally. Negative for clonus.   Tenderness to palpation at left greater trochanteric zone.   Calves are soft and non-tender bilaterally.     ASSESSMENT/PLAN:     Kobe Hanson Jr. is a 45 year old male who presents to the clinic for consultation on lumbar spine pain without radiculopathy. Today, Mr. Kumari reports a multi-month history of symptoms. He describes a constant ache in his low back with a more bothersome intermittent sharp, burning pain that initiates in the left greater trochanteric region. Again, there is no radicular symptoms following a specific distribution. This pain is not accompanied by paresthesias and numbness.   The patient's most recent imaging was reviewed with him today. It was explained that images show degenerative disc disease at L3-L4, L4-L5, and L5-S1. Central disc protrusion at L4-L5 with annular fissure. On exam, the patient is noted to have tenderness to palpation at left greater trochanteric zone. The patient has attempted conservative management with physical therapy, traction, chiropractic care, NSAIDs, a Medrol Dosepak, CBD and an IL epidural steroid injection administered on 06/29 at the L4-5 level, without resolution of symptoms.   Based on his physical exam, imaging  review, and past treatments, we feel that it would be in his best interest to continue a conservative approach by participating in a physical therapy program to include gait training and body menchanics. Hopefully this can be progressed to a home exercise program. We also referred him to the Pain Management Department for a left greater trochanteric bursal injection.    We would like to see him back as needed to further discuss possible surgical interventions or other conservative therapies. In the event that patient's symptoms worsen or change we would like to see him sooner. We also discussed signs of a worsening problem that he should seek being evaluated.        Respectfully,     DORIS Zelaya, AGAPITO  [facility]  [facility]  Tel: [phone]  Pager: [phone]     Exam, imaging, and plan reviewed by Dr. Hinkle.

## 2020-09-08 NOTE — NURSING NOTE
Kobe Hanson Jr. is a 45 year old male who presents for:  Chief Complaint   Patient presents with     Consult For     Lumbar        Initial Vitals:  BP (!) 154/108   Pulse 89   Resp 18   Ht 6' (1.829 m)   Wt 213 lb (96.6 kg)   SpO2 99%   BMI 28.89 kg/m   Estimated body mass index is 28.89 kg/m  as calculated from the following:    Height as of this encounter: 6' (1.829 m).    Weight as of this encounter: 213 lb (96.6 kg).. Body surface area is 2.22 meters squared. BP completed using cuff size: large  Severe Pain (6)    Nursing Comments: Pt present today for back issues.    Malachi Dumont, CMA

## 2020-09-08 NOTE — LETTER
9/8/2020         RE: Kobe Hanson Jr.  [address]  [facility] [address]        Dear Colleague,    Thank you for referring your patient, Kobe Hanson Jr., to the [facility]. Please see a copy of my visit note below.    NEUROSURGERY CLINIC CONSULT NOTE     DATE OF VISIT: 9/8/2020     SUBJECTIVE:     Kobe Hanson Jr. is a pleasant 45 year old male who presents to the clinic today for consultation on lumbar spine pain without radiculopathy. He is referred to the Neurosurgery Clinic by Dr. Montesinos in Pain Management.   Today, Mr. Kumari reports a multi-month history of symptoms. He describes a constant ache in his low back with a more bothersome intermittent sharp, burning pain that initiates in the left greater trochanteric region. Again, there is no radicular symptoms following a specific distribution. This pain is not accompanied by paresthesias and numbness. Prolonged walking, standing, and running aggravate the symptoms, while alleviation is obtained by rest and Ibuprofen. No mechanism of injury such as trauma or a fall is associated with the onset of the pain. There are no bowel or bladder changes.   The patient has participated in conservative therapies to include physical therapy, traction, chiropractic care, NSAIDs, a Medrol Dosepak, CBD and an IL epidural steroid injection administered on 06/29 at the L4-5 level. None of these modalities have provided any significant long term relief.         Current Outpatient Medications:      ibuprofen (ADVIL/MOTRIN) 200 MG capsule, Take 200 mg by mouth every 4 hours as needed for fever, Disp: , Rfl:      cyclobenzaprine (FLEXERIL) 10 MG tablet, Take 1 tablet (10 mg) by mouth 3 times daily as needed for muscle spasms (Patient not taking: Reported on 5/26/2020), Disp: 21 tablet, Rfl: 0     methylPREDNISolone (MEDROL DOSEPAK) 4 MG tablet therapy pack, Follow Package Directions (Patient not taking: Reported on 5/26/2020),  Disp: 21 tablet, Rfl: 0     Allergies   Allergen Reactions     Dust Mite Extract      Mold      Trees         History reviewed. No pertinent past medical history.     ROS: 10 point review of symptoms are negative other than the symptoms noted above in the HPI.     Family History has been reviewed with the patient, there are no pertinent findings to presenting concern.     Past Surgical History:   Procedure Laterality Date     VASECTOMY  2010        Social History     Tobacco Use     Smoking status: Former Smoker     Last attempt to quit: 2/3/2007     Years since quittin.6     Smokeless tobacco: Never Used   Substance Use Topics     Alcohol use: Yes     Drug use: No        OBJECTIVE:   BP (!) 154/108   Pulse 89   Resp 18   Ht 6' (1.829 m)   Wt 213 lb (96.6 kg)   SpO2 99%   BMI 28.89 kg/m     Body mass index is 28.89 kg/m .     Imaging:     MRI LUMBAR SPINE WITHOUT CONTRAST   2019 11:04 AM     Findings, per radiologist read, notable for:      Degenerative disc disease at L3-L4, L4-L5, and L5-S1. Central disc  protrusion at L4-L5 with annular fissure. More subtle disc  abnormalities at L3-4 and L5-S1.    Full radiological report in chart. Imaging reviewed with with patient today.     Exam:     Patient appears comfortable, conversational, and in no apparent distress.   Head: Normocephalic, without obvious abnormality, atraumatic, no facial asymmetry.   Eyes: conjunctivae/corneas clear. PERRL, EOM's intact.   Throat: lips, mucosa, and tongue normal; teeth and gums normal.   Neck: supple, symmetrical, trachea midline, no adenopathy and thyroid: not enlarged, symmetric, no tenderness/mass/nodules.   Lungs: clear to auscultation bilaterally.   Heart: regular rate and rhythm.   Abdomen: soft, non-tender; bowel sounds normal; no masses, no organomegaly.   Pulses: 2+ and symmetric.   Skin: Skin color, texture, turgor normal. No rashes or lesions.     CN II-XII grossly intact, alert and appropriate with  conversation and following commands.   Gait is non-antalgic. Able to tandem walk. Able to walk on toes and heels without difficulty.   Cervical spine is non tender to palpation. Appropriate range of motion of neck, not concerning for lhermitte's phenomenon.   Bilateral bicep 2/4 and tricep reflexes 1/4. Sensation intact throughout upper extremities.     UE muscle strength  Right  Left    Deltoid  5/5  5/5    Biceps  5/5  5/5    Triceps  5/5  5/5    Hand intrinsics  5/5  5/5    Hand grasp  5/5  5/5    Anderson signs  neg  neg      Lumbar spine is non tender to palpation   Intact sensation throughout lower extremities.   Bilateral patellar 2/4 and achilles reflex 1/4. Negative for pain with straight leg raise.     LE muscle strength  Right  Left    Iliopsoas (hip flexion)  5/5  5/5    Quad (knee extension)  5/5  5/5    Hamstring (knee flexion)  5/5  5/5    Gastrocnemius (PF)  5/5  5/5    Tibialis Ant. (DF)  5/5  5/5    EHL  5/5  5/5      Negative Babinski bilaterally. Negative for clonus.   Tenderness to palpation at left greater trochanteric zone.   Calves are soft and non-tender bilaterally.     ASSESSMENT/PLAN:     Kobe Hanson Jr. is a 45 year old male who presents to the clinic for consultation on lumbar spine pain without radiculopathy. Today, Mr. Kumari reports a multi-month history of symptoms. He describes a constant ache in his low back with a more bothersome intermittent sharp, burning pain that initiates in the left greater trochanteric region. Again, there is no radicular symptoms following a specific distribution. This pain is not accompanied by paresthesias and numbness.   The patient's most recent imaging was reviewed with him today. It was explained that images show degenerative disc disease at L3-L4, L4-L5, and L5-S1. Central disc protrusion at L4-L5 with annular fissure. On exam, the patient is noted to have tenderness to palpation at left greater trochanteric zone. The patient has attempted  conservative management with physical therapy, traction, chiropractic care, NSAIDs, a Medrol Dosepak, CBD and an IL epidural steroid injection administered on 06/29 at the L4-5 level, without resolution of symptoms.   Based on his physical exam, imaging review, and past treatments, we feel that it would be in his best interest to continue a conservative approach by participating in a physical therapy program to include gait training and body menchanics. Hopefully this can be progressed to a home exercise program. We also referred him to the Pain Management Department for a left greater trochanteric bursal injection.    We would like to see him back as needed to further discuss possible surgical interventions or other conservative therapies. In the event that patient's symptoms worsen or change we would like to see him sooner. We also discussed signs of a worsening problem that he should seek being evaluated.        Respectfully,     DORIS Zelaya PA-C  [facility] Neurosurgery  [facility]  Tel: [phone]  Pager: [phone]     Exam, imaging, and plan reviewed by Dr. Hinkle.       Again, thank you for allowing me to participate in the care of your patient.        Sincerely,        Brijesh Arcos PA-C

## 2020-09-15 ENCOUNTER — OFFICE VISIT (OUTPATIENT)
Dept: PALLIATIVE MEDICINE | Facility: CLINIC | Age: 45
End: 2020-09-15
Attending: PHYSICIAN ASSISTANT
Payer: COMMERCIAL

## 2020-09-15 VITALS — SYSTOLIC BLOOD PRESSURE: 143 MMHG | OXYGEN SATURATION: 99 % | HEART RATE: 72 BPM | DIASTOLIC BLOOD PRESSURE: 90 MMHG

## 2020-09-15 DIAGNOSIS — M70.62 GREATER TROCHANTERIC BURSITIS OF LEFT HIP: Primary | ICD-10-CM

## 2020-09-15 PROCEDURE — 20610 DRAIN/INJ JOINT/BURSA W/O US: CPT | Mod: LT | Performed by: PHYSICAL MEDICINE & REHABILITATION

## 2020-09-15 RX ORDER — LIDOCAINE HYDROCHLORIDE 10 MG/ML
1 INJECTION, SOLUTION EPIDURAL; INFILTRATION; INTRACAUDAL; PERINEURAL ONCE
Status: COMPLETED | OUTPATIENT
Start: 2020-09-15 | End: 2020-09-15

## 2020-09-15 RX ORDER — BUPIVACAINE HYDROCHLORIDE 5 MG/ML
1 INJECTION, SOLUTION EPIDURAL; INTRACAUDAL ONCE
Status: COMPLETED | OUTPATIENT
Start: 2020-09-15 | End: 2020-09-15

## 2020-09-15 RX ORDER — TRIAMCINOLONE ACETONIDE 40 MG/ML
40 INJECTION, SUSPENSION INTRA-ARTICULAR; INTRAMUSCULAR ONCE
Status: COMPLETED | OUTPATIENT
Start: 2020-09-15 | End: 2020-09-15

## 2020-09-15 RX ADMIN — BUPIVACAINE HYDROCHLORIDE 5 MG: 5 INJECTION, SOLUTION EPIDURAL; INTRACAUDAL at 15:47

## 2020-09-15 RX ADMIN — LIDOCAINE HYDROCHLORIDE 1 ML: 10 INJECTION, SOLUTION EPIDURAL; INFILTRATION; INTRACAUDAL; PERINEURAL at 15:47

## 2020-09-15 RX ADMIN — TRIAMCINOLONE ACETONIDE 40 MG: 40 INJECTION, SUSPENSION INTRA-ARTICULAR; INTRAMUSCULAR at 15:48

## 2020-09-15 ASSESSMENT — PAIN SCALES - GENERAL: PAINLEVEL: SEVERE PAIN (7)

## 2020-09-15 NOTE — PATIENT INSTRUCTIONS
[facility]   Post Procedure Instructions    Today you had:  trigger point injections   occipital nerve block   bursa injection      Medications used:  lidocaine   bupivicaine   kenolog   dexamethasone          Go to the emergency room if you develop any shortness of breath    Monitor the injection sites for signs and symptoms of infection-fever, chills, redness, swelling, warmth, or drainage to areas.    You may have soreness at injection sites for up to 24 hours.    You may apply ice to the painful areas to help minimize the discomfort of the needle pokes.    Do not apply heat to sites for at least 12 hours.    You may use anti-inflammatory medications or Tylenol for pain control if necessary    Pain Clinic phone number during work hours Monday-Friday:  [phone]    After hours provider line: [phone]

## 2020-09-16 ENCOUNTER — APPOINTMENT (OUTPATIENT)
Dept: GENERAL RADIOLOGY | Facility: CLINIC | Age: 45
End: 2020-09-16
Attending: PHYSICIAN ASSISTANT
Payer: COMMERCIAL

## 2020-09-16 ENCOUNTER — OFFICE VISIT (OUTPATIENT)
Dept: URGENT CARE | Facility: URGENT CARE | Age: 45
End: 2020-09-16
Payer: COMMERCIAL

## 2020-09-16 VITALS
RESPIRATION RATE: 16 BRPM | TEMPERATURE: 97.5 F | BODY MASS INDEX: 27.67 KG/M2 | DIASTOLIC BLOOD PRESSURE: 99 MMHG | OXYGEN SATURATION: 100 % | WEIGHT: 204 LBS | SYSTOLIC BLOOD PRESSURE: 148 MMHG | HEART RATE: 60 BPM

## 2020-09-16 DIAGNOSIS — M13.0 POLYARTHRITIS: Primary | ICD-10-CM

## 2020-09-16 LAB
ALBUMIN SERPL-MCNC: 4.1 G/DL (ref 3.4–5)
ALP SERPL-CCNC: 55 U/L (ref 40–150)
ALT SERPL W P-5'-P-CCNC: 35 U/L (ref 0–70)
ANION GAP SERPL CALCULATED.3IONS-SCNC: 4 MMOL/L (ref 3–14)
AST SERPL W P-5'-P-CCNC: 17 U/L (ref 0–45)
BASOPHILS # BLD AUTO: 0 10E9/L (ref 0–0.2)
BASOPHILS NFR BLD AUTO: 0.2 %
BILIRUB SERPL-MCNC: 0.8 MG/DL (ref 0.2–1.3)
BUN SERPL-MCNC: 15 MG/DL (ref 7–30)
CALCIUM SERPL-MCNC: 9.5 MG/DL (ref 8.5–10.1)
CHLORIDE SERPL-SCNC: 105 MMOL/L (ref 94–109)
CO2 SERPL-SCNC: 28 MMOL/L (ref 20–32)
CREAT SERPL-MCNC: 0.93 MG/DL (ref 0.66–1.25)
CRP SERPL-MCNC: 6.5 MG/L (ref 0–8)
DIFFERENTIAL METHOD BLD: NORMAL
EOSINOPHIL # BLD AUTO: 0.1 10E9/L (ref 0–0.7)
EOSINOPHIL NFR BLD AUTO: 1.2 %
ERYTHROCYTE [DISTWIDTH] IN BLOOD BY AUTOMATED COUNT: 11.5 % (ref 10–15)
ERYTHROCYTE [SEDIMENTATION RATE] IN BLOOD BY WESTERGREN METHOD: 5 MM/H (ref 0–15)
GFR SERPL CREATININE-BSD FRML MDRD: >90 ML/MIN/{1.73_M2}
GLUCOSE SERPL-MCNC: 92 MG/DL (ref 70–99)
HCT VFR BLD AUTO: 47.5 % (ref 40–53)
HGB BLD-MCNC: 16.5 G/DL (ref 13.3–17.7)
LYMPHOCYTES # BLD AUTO: 0.8 10E9/L (ref 0.8–5.3)
LYMPHOCYTES NFR BLD AUTO: 15.7 %
MCH RBC QN AUTO: 32.2 PG (ref 26.5–33)
MCHC RBC AUTO-ENTMCNC: 34.7 G/DL (ref 31.5–36.5)
MCV RBC AUTO: 93 FL (ref 78–100)
MONOCYTES # BLD AUTO: 0.7 10E9/L (ref 0–1.3)
MONOCYTES NFR BLD AUTO: 14.3 %
NEUTROPHILS # BLD AUTO: 3.4 10E9/L (ref 1.6–8.3)
NEUTROPHILS NFR BLD AUTO: 68.6 %
PLATELET # BLD AUTO: 215 10E9/L (ref 150–450)
POTASSIUM SERPL-SCNC: 4.3 MMOL/L (ref 3.4–5.3)
PROT SERPL-MCNC: 7.8 G/DL (ref 6.8–8.8)
RBC # BLD AUTO: 5.13 10E12/L (ref 4.4–5.9)
SODIUM SERPL-SCNC: 137 MMOL/L (ref 133–144)
WBC # BLD AUTO: 4.9 10E9/L (ref 4–11)

## 2020-09-16 PROCEDURE — 86618 LYME DISEASE ANTIBODY: CPT | Performed by: PHYSICIAN ASSISTANT

## 2020-09-16 PROCEDURE — 80053 COMPREHEN METABOLIC PANEL: CPT | Performed by: PHYSICIAN ASSISTANT

## 2020-09-16 PROCEDURE — 85652 RBC SED RATE AUTOMATED: CPT | Performed by: PHYSICIAN ASSISTANT

## 2020-09-16 PROCEDURE — 86200 CCP ANTIBODY: CPT | Performed by: PHYSICIAN ASSISTANT

## 2020-09-16 PROCEDURE — 86666 EHRLICHIA ANTIBODY: CPT | Mod: 90 | Performed by: PHYSICIAN ASSISTANT

## 2020-09-16 PROCEDURE — 99000 SPECIMEN HANDLING OFFICE-LAB: CPT | Performed by: PHYSICIAN ASSISTANT

## 2020-09-16 PROCEDURE — 86431 RHEUMATOID FACTOR QUANT: CPT | Performed by: PHYSICIAN ASSISTANT

## 2020-09-16 PROCEDURE — 86140 C-REACTIVE PROTEIN: CPT | Performed by: PHYSICIAN ASSISTANT

## 2020-09-16 PROCEDURE — 99215 OFFICE O/P EST HI 40 MIN: CPT | Performed by: PHYSICIAN ASSISTANT

## 2020-09-16 PROCEDURE — 86747 PARVOVIRUS ANTIBODY: CPT | Mod: 90 | Performed by: PHYSICIAN ASSISTANT

## 2020-09-16 PROCEDURE — 85025 COMPLETE CBC W/AUTO DIFF WBC: CPT | Performed by: PHYSICIAN ASSISTANT

## 2020-09-16 PROCEDURE — 36415 COLL VENOUS BLD VENIPUNCTURE: CPT | Performed by: PHYSICIAN ASSISTANT

## 2020-09-16 NOTE — PROGRESS NOTES
SUBJECTIVE:  Kobe is a 45 year old male who presents to urgent care with sudden onset of joint stiffness and aches.  Started Monday night with left wrist aching and then into his right wrist.  The next morning he had stiffness and aching in his ankles, knees, wrists and elbows bilaterally.  His shoulders are minimally affected if at all.  He has some chronic back pain which has not changed.  No hip pain but did have a trochanteric bursitis which was injected with corticosteroid yesterday.  He denies any fevers, chills but feels fatigued today.  He denies any headaches, sore throat, muscle weakness, paresthesias, cough, shortness of breath, wheeze, chest pain, nausea, vomiting, diarrhea, rash or muscle aches.  He feels like his hands are cold today    Chief Complaint   Patient presents with     Urgent Care     joint pain over various parts of body that started Monday.      ROS: See HPI    Current Outpatient Medications   Medication     ibuprofen (ADVIL/MOTRIN) 200 MG capsule     No current facility-administered medications for this visit.       Patient Active Problem List   Diagnosis     Hyperlipidemia LDL goal <160     Elevated BP     Abnormal results of liver function studies        No past medical history on file.  Past Surgical History:   Procedure Laterality Date     VASECTOMY  2010     Family History   Problem Relation Age of Onset     Hypertension Father      Social History     Tobacco Use     Smoking status: Former Smoker     Last attempt to quit: 2/3/2007     Years since quittin.6     Smokeless tobacco: Never Used   Substance Use Topics     Alcohol use: Yes        OBJECTIVE:  BP (!) 148/99   Pulse 60   Temp 97.5  F (36.4  C) (Temporal)   Resp 16   Wt 92.5 kg (204 lb)   SpO2 100%   BMI 27.67 kg/m       GENERAL APPEARANCE: Appears healthy but does walk somewhat stiffly     EYES: EOMI, - PERRL     HENT: ear canals and TM's normal and nose and mouth without ulcers or lesions     NECK: Supple,  full range of motion, nontender.     CV: regular rates and rhythm, normal S1 S2, no S3 or S4 and no murmur, click or rub     MS: No visible joint deformity or dislocation.  Wrist tenderness bilaterally over the carpal tunnel but no other bony tenderness.  Pain with extension but not flexion.  No elbow tenderness but pain with terminal flexion extension bilaterally.  Patellar tendon tenderness bilaterally but no other bony knee pain.  Pain with terminal extension.  Ankle tenderness over the ATFL but no other bony tenderness, no heel tenderness.     SKIN: no suspicious lesions or rashes on scalp, neck, trunk, extremities or abdomen     NEURO: Upper and lower extremity strength 5/5, normal gait.  Normal sensation.  Cranial nerves II through XII grossly intact.     PSYCH: mentation appears normal. and affect normal/bright    DIAGNOSTICS    Results for orders placed or performed in visit on 09/16/20   CBC with platelets and differential     Status: None   Result Value Ref Range    WBC 4.9 4.0 - 11.0 10e9/L    RBC Count 5.13 4.4 - 5.9 10e12/L    Hemoglobin 16.5 13.3 - 17.7 g/dL    Hematocrit 47.5 40.0 - 53.0 %    MCV 93 78 - 100 fl    MCH 32.2 26.5 - 33.0 pg    MCHC 34.7 31.5 - 36.5 g/dL    RDW 11.5 10.0 - 15.0 %    Platelet Count 215 150 - 450 10e9/L    % Neutrophils 68.6 %    % Lymphocytes 15.7 %    % Monocytes 14.3 %    % Eosinophils 1.2 %    % Basophils 0.2 %    Absolute Neutrophil 3.4 1.6 - 8.3 10e9/L    Absolute Lymphocytes 0.8 0.8 - 5.3 10e9/L    Absolute Monocytes 0.7 0.0 - 1.3 10e9/L    Absolute Eosinophils 0.1 0.0 - 0.7 10e9/L    Absolute Basophils 0.0 0.0 - 0.2 10e9/L    Diff Method Automated Method    ESR: Erythrocyte sedimentation rate     Status: None   Result Value Ref Range    Sed Rate 5 0 - 15 mm/h   CRP, inflammation     Status: None   Result Value Ref Range    CRP Inflammation 6.5 0.0 - 8.0 mg/L   Comprehensive metabolic panel (BMP + Alb, Alk Phos, ALT, AST, Total. Bili, TP)     Status: None   Result  Value Ref Range    Sodium 137 133 - 144 mmol/L    Potassium 4.3 3.4 - 5.3 mmol/L    Chloride 105 94 - 109 mmol/L    Carbon Dioxide 28 20 - 32 mmol/L    Anion Gap 4 3 - 14 mmol/L    Glucose 92 70 - 99 mg/dL    Urea Nitrogen 15 7 - 30 mg/dL    Creatinine 0.93 0.66 - 1.25 mg/dL    GFR Estimate >90 >60 mL/min/[1.73_m2]    GFR Estimate If Black >90 >60 mL/min/[1.73_m2]    Calcium 9.5 8.5 - 10.1 mg/dL    Bilirubin Total 0.8 0.2 - 1.3 mg/dL    Albumin 4.1 3.4 - 5.0 g/dL    Protein Total 7.8 6.8 - 8.8 g/dL    Alkaline Phosphatase 55 40 - 150 U/L    ALT 35 0 - 70 U/L    AST 17 0 - 45 U/L        ASSESSMENT/PLAN:  Kobe was seen today for urgent care.    Diagnoses and all orders for this visit:    Polyarthritis  -     CBC with platelets and differential  -     Lyme Disease Keyla with reflex to WB Serum  -     ESR: Erythrocyte sedimentation rate  -     CRP, inflammation  -     Comprehensive metabolic panel (BMP + Alb, Alk Phos, ALT, AST, Total. Bili, TP)  -     Parvovirus B19 antibodies IgG IgM  -     Rheumatoid factor  -     Cyclic Citrullinated Peptide Antibody IgG  -     Ehrlichia chaffeenis Abys IgG and IgM  -     Anaplasma phagocytoph antibody IgG IgM  -     Cancel: Anaplasma phagocytoph antibody IgG  -     Cancel: XR Chest 2 Views      Differential is wide and includes tickborne illness, rheumatoid arthritis, SLE, sarcoidosis, infectious arthritis, inflammatory arthritis among others.  Will begin work-up for patient follow-up with primary care and provide pain control.  Discussed signs symptoms that warrant emergency room visit.     -Ibuprofen for pain control and joint stiffness  -Follow-up with primary care on Friday for further work-up and discussion of lab results  -CBC, comp, sed rate and CRP discussed with patient.  The remaining labs will be discussed and reviewed by his primary care provider.    The plan of care was discussed with the patient. They understand and agree with the course of treatment prescribed. A  printed summary was given including instructions and medications.    Rasta Salazar PA-C

## 2020-09-17 ENCOUNTER — TELEPHONE (OUTPATIENT)
Dept: URGENT CARE | Facility: URGENT CARE | Age: 45
End: 2020-09-17

## 2020-09-17 LAB
B BURGDOR IGG+IGM SER QL: 0.09 (ref 0–0.89)
B19V IGG SER IA-ACNC: 5.69 IV
B19V IGM SER IA-ACNC: 0.32 IV
CCP AB SER IA-ACNC: 1 U/ML
RHEUMATOID FACT SER NEPH-ACNC: <7 IU/ML (ref 0–20)

## 2020-09-17 NOTE — TELEPHONE ENCOUNTER
Called and discussed results with patient. Will continue to monitor and treat symptoms with NSAIDs. He has an appointment tomorrow and he can keep this if he desires    Rasta Salazar PA-C

## 2020-09-18 LAB
E CHAFFEENSIS IGG TITR SER: NORMAL {TITER}
E CHAFFEENSIS IGM TITR SER: NORMAL {TITER}

## 2020-09-19 LAB
A PHAGOCYTOPH IGG TITR SER IF: NORMAL {TITER}
A PHAGOCYTOPH IGM TITR SER IF: NORMAL {TITER}

## 2020-09-30 ENCOUNTER — THERAPY VISIT (OUTPATIENT)
Dept: PHYSICAL THERAPY | Facility: CLINIC | Age: 45
End: 2020-09-30
Payer: COMMERCIAL

## 2020-09-30 DIAGNOSIS — M54.50 CHRONIC BILATERAL LOW BACK PAIN WITHOUT SCIATICA: ICD-10-CM

## 2020-09-30 DIAGNOSIS — M54.16 LEFT LUMBAR RADICULITIS: ICD-10-CM

## 2020-09-30 DIAGNOSIS — G89.29 CHRONIC BILATERAL LOW BACK PAIN WITHOUT SCIATICA: ICD-10-CM

## 2020-09-30 PROCEDURE — 97110 THERAPEUTIC EXERCISES: CPT | Mod: GP | Performed by: PHYSICAL THERAPIST

## 2020-09-30 PROCEDURE — 97161 PT EVAL LOW COMPLEX 20 MIN: CPT | Mod: GP | Performed by: PHYSICAL THERAPIST

## 2020-09-30 NOTE — LETTER
[facility]  [address]  [facility] [address]  [phone]    2020    Re: Kobe Hanson Jr.   :   1975  MRN:  [id number]   REFERRING PHYSICIAN:   Brijesh Arcos    [facility]    Date of Initial Evaluation:  2020  Visits:  Rxs Used: 1  Reason for Referral:     Left lumbar radiculitis  Chronic bilateral low back pain without sciatica    EVALUATION SUMMARY  [facility] Initial Evaluation  Subjective:    Patient Health History  Kobe Hanson Jr. being seen for Pain in lower back.   Problem began: 2018.   Problem occurred: Has been getting worse for 15 years, in 2018 I pulled multiple muscles in my back while doing yoga, that is why I am listing that date   Pain is reported as 3/10 on pain scale.  General health as reported by patient is poor.  Pertinent medical history includes: high blood pressure. 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 This has caused limited mobility, constant pain, and lack of sleep..   Medical allergies: none.   Surgeries include:  None.    Current medications:  Anti-inflammatory.    Current occupation is Sales.   Primary job tasks include:  Computer work and prolonged standing.                Therapist Generated HPI Evaluation  Problem details: Patient reports longstanding, intermittent low back pain for the past 15-20 years. In 2018 was doing yoga exercises and pulled his lumbar area. He had seen a chiropractor with good results. He did core strengthening and was walking 3-5 miles/day.  He was independent with exs through the spring, then aggravated his low back by bending and reaching forward. He was eventually sent to pain management, had a lumbar injection with relief for one week only. He then was sent to spine surgeon, who sent him here  for gait training and body mechanics. He found that his left hip was inflamed and had a hip injection with some relief. He then started having multiple joint pains and was diagnosed with parvo virus. .         Type of problem:  Lumbar.          Re: Kobe Hanson Jr.   :   1975    This is a recurrent condition.  Condition occurred with:  Lifting and twisting.  Patient reports pain:  Lumbar spine left.  Pain radiates to:  Gluteals left and gluteals right (left hip).   Associated symptoms:  Loss of motion/stiffness and loss of strength. Symptoms are exacerbated by lifting, walking and standing  and relieved by NSAID's, activity/movement and rest.    Objective:  Gait:    Gait Type:  Antalgic     Flexibility/Screens:   Lower Extremity:  Decreased left lower extremity flexibility:Hip Flexors; Quadriceps; Hamstrings and Gastroc  Decreased right lower extremity flexibility:  Hip Flexors; Quadriceps; Hamstrings and Gastroc  Lumbar/SI Evaluation  ROM:    AROM Lumbar:   Flexion:            70%  Ext:                    50% ERP   Side Bend:        Left:  70%    Right:  60% ERP  Rotation:           Left:  80%    Right:  80%  Side Glide:        Left:     Right:   Strength: lumbar=3+/5  Lumbar Myotomes:  normal  Lumbar Dermtomes:  normal  Neural Tension/Mobility:  Lumbar:  Normal    Lumbar Palpation:    Tenderness present at Left:    Greater Trochanter and Hip Flexors  Tenderness present at Right: Greater Trochanter  Lumbar Provocation:    Left positive with:  PROM hip  Right positive with: PROM hip    Assessment/Plan:    Patient is a 45 year old male with lumbar complaints.    Patient has the following significant findings with corresponding treatment plan.                Diagnosis 1:  Low back pain   Pain -  hot/cold therapy and education  Decreased ROM/flexibility - manual therapy, therapeutic exercise and home program  Decreased strength - therapeutic exercise, therapeutic activities and home program  Decreased  function - therapeutic activities and home program                  Re: Kobe Hanson Jr.   :   1975      Therapy Evaluation Codes:   1) History comprised of:   Personal factors that impact the plan of care:      None.    Comorbidity factors that impact the plan of care are:      None.     Medications impacting care: None.  2) Examination of Body Systems comprised of:   Body structures and functions that impact the plan of care:      Lumbar spine.   Activity limitations that impact the plan of care are:      Lifting, Squatting/kneeling and Walking.  3) Clinical presentation characteristics are:   Stable/Uncomplicated.  4) Decision-Making    Low complexity using standardized patient assessment instrument and/or measureable assessment of functional outcome.  Cumulative Therapy Evaluation is: Low complexity.    Previous and current functional limitations:  (See Goal Flow Sheet for this information)    Short term and Long term goals: (See Goal Flow Sheet for this information)     Communication ability:  Patient appears to be able to clearly communicate and understand verbal and written communication and follow directions correctly.  Treatment Explanation - The following has been discussed with the patient:   RX ordered/plan of care  Anticipated outcomes  Possible risks and side effects  This patient would benefit from PT intervention to resume normal activities.   Rehab potential is good.  Frequency:  2 X a month, once daily  Duration:  for 3 months  Discharge Plan:  Achieve all LTG.  Independent in home treatment program.  Reach maximal therapeutic benefit.  Please refer to the daily flowsheet for treatment today, total treatment time and time spent performing 1:1 timed codes.     Thank you for your referral.    INQUIRIES  Therapist: Courtney Luu PT  [facility] [place]  [address]  [facility] [address]  Phone: [phone]  Fax: [phone]

## 2020-09-30 NOTE — PROGRESS NOTES
[facility] Initial Evaluation  Subjective:    Patient Health History  Kobe Hanson Jr. being seen for Pain in lower back.     Problem began: 2/12/2018.   Problem occurred: Has been getting worse for 15 years, in 2018 I pulled multiple muscles in my back while doing yoga, that is why I am listing that date   Pain is reported as 3/10 on pain scale.  General health as reported by patient is poor.  Pertinent medical history includes: high blood pressure. 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 This has caused limited mobility, constant pain, and lack of sleep..     Medical allergies: none.   Surgeries include:  None.    Current medications:  Anti-inflammatory.    Current occupation is Sales.   Primary job tasks include:  Computer work and prolonged standing.                  Therapist Generated HPI Evaluation  Problem details: Patient reports longstanding, intermittent low back pain for the past 15-20 years. In January 2018 was doing yoga exercises and pulled his lumbar area. He had seen a chiropractor with good results. He did core strengthening and was walking 3-5 miles/day.  He was independent with exs through the spring, then aggravated his low back by bending and reaching forward. He was eventually sent to pain management, had a lumbar injection with relief for one week only. He then was sent to spine surgeon, who sent him here for gait training and body mechanics. He found that his left hip was inflamed and had a hip injection with some relief. He then started having multiple joint pains and was diagnosed with parvo virus. .         Type of problem:  Lumbar.    This is a recurrent condition.  Condition occurred with:  Lifting and twisting.    Patient reports pain:  Lumbar spine left.    Pain radiates to:  Gluteals left and gluteals right (left hip).     Associated symptoms:   Loss of motion/stiffness and loss of strength. Symptoms are exacerbated by lifting, walking and standing  and relieved by NSAID's, activity/movement and rest.                              Objective:    Gait:    Gait Type:  Antalgic         Flexibility/Screens:       Lower Extremity:  Decreased left lower extremity flexibility:Hip Flexors; Quadriceps; Hamstrings and Gastroc    Decreased right lower extremity flexibility:  Hip Flexors; Quadriceps; Hamstrings and Gastroc               Lumbar/SI Evaluation  ROM:    AROM Lumbar:   Flexion:            70%  Ext:                    50% ERP   Side Bend:        Left:  70%    Right:  60% ERP  Rotation:           Left:  80%    Right:  80%  Side Glide:        Left:     Right:         Strength: lumbar=3+/5  Lumbar Myotomes:  normal                Lumbar Dermtomes:  normal                Neural Tension/Mobility:  Lumbar:  Normal        Lumbar Palpation:    Tenderness present at Left:    Greater Trochanter and Hip Flexors  Tenderness present at Right: Greater Trochanter    Lumbar Provocation:    Left positive with:  PROM hip  Right positive with: PROM hip                                                 General     ROS    Assessment/Plan:    Patient is a 45 year old male with lumbar complaints.    Patient has the following significant findings with corresponding treatment plan.                Diagnosis 1:  Low back pain   Pain -  hot/cold therapy and education  Decreased ROM/flexibility - manual therapy, therapeutic exercise and home program  Decreased strength - therapeutic exercise, therapeutic activities and home program  Decreased function - therapeutic activities and home program    Therapy Evaluation Codes:   1) History comprised of:   Personal factors that impact the plan of care:      None.    Comorbidity factors that impact the plan of care are:      None.     Medications impacting care: None.  2) Examination of Body Systems comprised of:   Body structures and functions  that impact the plan of care:      Lumbar spine.   Activity limitations that impact the plan of care are:      Lifting, Squatting/kneeling and Walking.  3) Clinical presentation characteristics are:   Stable/Uncomplicated.  4) Decision-Making    Low complexity using standardized patient assessment instrument and/or measureable assessment of functional outcome.  Cumulative Therapy Evaluation is: Low complexity.    Previous and current functional limitations:  (See Goal Flow Sheet for this information)    Short term and Long term goals: (See Goal Flow Sheet for this information)     Communication ability:  Patient appears to be able to clearly communicate and understand verbal and written communication and follow directions correctly.  Treatment Explanation - The following has been discussed with the patient:   RX ordered/plan of care  Anticipated outcomes  Possible risks and side effects  This patient would benefit from PT intervention to resume normal activities.   Rehab potential is good.    Frequency:  2 X a month, once daily  Duration:  for 3 months  Discharge Plan:  Achieve all LTG.  Independent in home treatment program.  Reach maximal therapeutic benefit.    Please refer to the daily flowsheet for treatment today, total treatment time and time spent performing 1:1 timed codes.

## 2020-10-13 ENCOUNTER — THERAPY VISIT (OUTPATIENT)
Dept: PHYSICAL THERAPY | Facility: CLINIC | Age: 45
End: 2020-10-13
Payer: COMMERCIAL

## 2020-10-13 DIAGNOSIS — G89.29 CHRONIC BILATERAL LOW BACK PAIN WITHOUT SCIATICA: ICD-10-CM

## 2020-10-13 DIAGNOSIS — M54.50 CHRONIC BILATERAL LOW BACK PAIN WITHOUT SCIATICA: ICD-10-CM

## 2020-10-13 PROCEDURE — 97112 NEUROMUSCULAR REEDUCATION: CPT | Mod: GP | Performed by: PHYSICAL THERAPIST

## 2020-10-13 PROCEDURE — 97110 THERAPEUTIC EXERCISES: CPT | Mod: GP | Performed by: PHYSICAL THERAPIST

## 2020-10-20 ENCOUNTER — MYC MEDICAL ADVICE (OUTPATIENT)
Dept: PALLIATIVE MEDICINE | Facility: CLINIC | Age: 45
End: 2020-10-20

## 2020-10-20 DIAGNOSIS — M70.61 GREATER TROCHANTERIC BURSITIS OF RIGHT HIP: Primary | ICD-10-CM

## 2020-10-23 NOTE — TELEPHONE ENCOUNTER
Injection scheduled for 10/27.     JACKY RhoadesN, RN-BC  Patient Care Supervisor  [facility] [place]    
Ok to schedule for a right trochanteric bursa injection. Order placed.     Tiara Montesinos MD  [facility]   
Pikhub message sent to patient:  Good Afternoon Dr Yamel Cerrato did review your [employer] message.  She placed the order for the Right Trochanteric Bursa Injection. Someone from our scheduling department will be calling you to get this set up.  If you do not hear from anyone by Friday, please call [phone].  Hope you have a good day.  Stay safe and healthy.    Take Care,    Claudette Henson RN  Care Coordinator  [facility]   
Routing to provider to review.     Yes, the pain is in the same area on the right hip as it was on the left hip. When the pain in the right hip occurs,  it does not radiate towards the spine like the left hip did, it is just in the hip and then goes down the leg to about mid thigh.    Arpita RAMOS, RN Care Coordinator  [facility]  Pain Management    
Will leave encounter open for patient response/chart review by nursing. Then route to provider    Trice Cerrato,     Glad to hear the injection was helpful on the left side for you. You should not need to see your primary care provider but I will need to inquire with Dr Montesinos to see what she would recommend. She may want to have an appointment to evaluate your hip before she can determine if the same injection would be helpful. You you say that the right hip pain is in the same area and type of pain as what you had on the left?  Dr Montesinos is out of the office until later this week but I will have her review this and your response when she returns.    Arpita RAMOS, RN Care Coordinator  [facility]  Pain Management      ---------below from pt-----------  Trice Montesinos,     The injection you gave me in my left hip a month ago is working great, my back pain has subsided on that side and my strength on that side of my body is much better, I feel normal again on that side of my body after doing PT.  Now that the pain on that side is gone I have realized that I have a pain in the right hip.  I think the more sever pain in the left side was masking the right side hip pain.  Can I come see you for the same injection in my right side hip that I had in my left side or do I need to make an appointment with my regular doctor to be referred to you?     The pain in my right hip is local to the hip and is mild during the day but is a stabbing pain when I lay on my side during sleep.  It wakes me up during the night if I roll over.  
WDL

## 2020-10-27 ENCOUNTER — OFFICE VISIT (OUTPATIENT)
Dept: PALLIATIVE MEDICINE | Facility: CLINIC | Age: 45
End: 2020-10-27
Payer: COMMERCIAL

## 2020-10-27 VITALS — OXYGEN SATURATION: 96 % | SYSTOLIC BLOOD PRESSURE: 142 MMHG | DIASTOLIC BLOOD PRESSURE: 92 MMHG | HEART RATE: 74 BPM

## 2020-10-27 DIAGNOSIS — M70.61 GREATER TROCHANTERIC BURSITIS OF RIGHT HIP: Primary | ICD-10-CM

## 2020-10-27 PROCEDURE — 20610 DRAIN/INJ JOINT/BURSA W/O US: CPT | Mod: RT | Performed by: PHYSICAL MEDICINE & REHABILITATION

## 2020-10-27 RX ORDER — BUPIVACAINE HYDROCHLORIDE 5 MG/ML
1 INJECTION, SOLUTION EPIDURAL; INTRACAUDAL ONCE
Status: COMPLETED | OUTPATIENT
Start: 2020-10-27 | End: 2020-10-27

## 2020-10-27 RX ORDER — TRIAMCINOLONE ACETONIDE 40 MG/ML
40 INJECTION, SUSPENSION INTRA-ARTICULAR; INTRAMUSCULAR ONCE
Status: COMPLETED | OUTPATIENT
Start: 2020-10-27 | End: 2020-10-27

## 2020-10-27 RX ADMIN — BUPIVACAINE HYDROCHLORIDE 5 MG: 5 INJECTION, SOLUTION EPIDURAL; INTRACAUDAL at 13:33

## 2020-10-27 RX ADMIN — TRIAMCINOLONE ACETONIDE 40 MG: 40 INJECTION, SUSPENSION INTRA-ARTICULAR; INTRAMUSCULAR at 13:34

## 2020-10-27 ASSESSMENT — PAIN SCALES - GENERAL: PAINLEVEL: MILD PAIN (3)

## 2020-10-27 NOTE — LETTER
[facility] [place]  10/27/20    Patient: Kobe Hanson Jr.  YOB: 1975  Medical Record Number: [id number]                                                                  Opioid / Opioid Plus Controlled Substance Agreement    I understand that my care provider has prescribed an opioid (narcotic) controlled substance to help manage my condition(s). I am taking this medicine to help me function or work. I know this is strong medicine, and that it can cause serious side effects. Opioid medicine can be sedating, addicting and may cause a dependency on the drug. They can affect my ability to drive or think, and cause depression. They need to be taken exactly as prescribed. Combining opioids with certain medicines or chemicals (such as cocaine, sedatives and tranquilizers, sleeping pills, meth) can be dangerous or even fatal. Also, if I stop opioids suddenly, I may have severe withdrawal symptoms. Last, I understand that opioids do not work for all types of pain nor for all patients. If not helpful, I may be asked to stop them.        The risks, benefits, and side effects of these medicine(s) were explained to me. I agree that:    1. I will take part in other treatments as advised by my care team. This may be psychiatry or counseling, physical therapy, behavioral therapy, group treatment or a referral to a pain clinic. I will reduce or stop my medicine when my care team tells me to do so.  2. I will take my medicines as prescribed. I will not change the dose or schedule unless my care team tells me to. There will be no refills if I  run out early.   I may be contactedwithout warning and asked to complete a urine drug test or pill count at any time.   3. I will keep all my appointments, and understand this is part of the monitoring of opioids. My care team may require an office visit for EVERY opioid/controlled substance refill. If I miss appointments or don t follow instructions, my  care team may stop my medicine.  4. I will not ask other providers to prescribe controlled substances, and I will not accept controlled substances from other people. If I need another prescribed controlled substance for a new reason, I will tell my care team within 1 business day.  5. I will use one pharmacy to fill all of my controlled substance prescriptions, and it is up to me to make sure that I do not run out of my medicines on weekends or holidays. If my care team is willing to refill my opioid prescription without a visit, I must request refills only during office hours, refills may take up to 3 days to process, and it may take up to 5 to 7 days for my medicine to be mailed and ready at my pharmacy. Prescriptions will not be mailed anywhere except my pharmacy.        [id number]  Rev 12/18         Registration to scan to EHR                             Page 1 of 2               Controlled Substance Agreement [facility] [place]  10/27/20  Patient: Kobe Hanson Jr.  YOB: 1975  Medical Record Number: [id number]                                                                  6. I am responsible for my prescriptions. If the medicine/prescription is lost or stolen, it will not be replaced. I also agree not to share controlled substance medicines with anyone.  7. I agree to not use ANY illegal or recreational drugs. This includes marijuana, cocaine, bath salts or other drugs. I agree not to use alcohol unless my care team says I may.          I agree to give urine samples whenever asked. If I don t give a urine sample, the care team may stop my medicine.    8. If I enroll in the [place] [facility] Marijuana program, I will tell my care team. I will also sign an agreement to share my medical records with my care team.   9. I will bring in my list of medicines (or my medicine bottles) each time I come to the clinic.   10. I will tell my care team right away if I become  pregnant or have a new medical problem treated outside of my regular clinic.  11. I understand that this medicine can affect my thinking and judgment. It may be unsafe for me to drive, use machinery and do dangerous tasks. I will not do any of these things until I know how the medicine affects me. If my dose changes, I will wait to see how it affects me. I will contact my care team if I have concerns about medicine side effects.    I understand that if I do not follow any of the conditions above, my prescriptions or treatment may be stopped.      I agree that my provider, clinic care team, and pharmacy may work with any city, state or federal law enforcement agency that investigates the misuse, sale, or other diversion of my controlled medicine. I will allow my provider to discuss my care with or share a copy of this agreement with any other treating provider, pharmacy or emergency room where I receive care. I agree to give up (waive) any right of privacy or confidentiality with respect to these consents.     I have read this agreement and have asked questions about anything I did not understand.      ________________________________________________________________________  Patient signature - Date/Time -  Kobe Hanson Jr.                                      ________________________________________________________________________  Witness signature                                                            ________________________________________________________________________  Provider signature - Tiara Montesinos MD      [id number]  Rev 12/18         Registration to scan to EHR                         Page 2 of 2                   Controlled Substance Agreement Opioid           Page 1 of 2  Opioid Pain Medicines (also known as Narcotics)  What You Need to Know    What are opioids?   Opioids are pain medicines that must be prescribed by a doctor.  They are also known as narcotics.    Examples are:     morphine  (MS Contin, Jacquelin)    oxycodone (Oxycontin)    oxycodone and acetaminophen (Percocet)    hydrocodone and acetaminophen (Vicodin, Norco)     fentanyl patch (Duragesic)     hydromorphone (Dilaudid)     methadone     What do opioids do well?   Opioids are best for short-term pain after a surgery or injury. They also work well for cancer pain. Unlike other pain medicines, they do not cause liver or kidney failure or ulcers. They may help some people with long-lasting (chronic) pain.     What do opioids NOT do well?   Opioids never get rid of pain entirely, and they do not work well for most patients with chronic pain. Opioids do not reduce swelling, one of the causes of pain. They also don t work well for nerve pain.                           For informational purposes only.  Not to replace the advice of your care provider.  Copyright [facility]. All right reserved. [employer] 811244-Aaf 02/18.      Page 2 of 2    Risks and side effects   Talk to your doctor before you start or decide to keep taking one of these medicines. Side effects include:    Lowering your breathing rate enough to cause death    Overdose, including death, especially if taking higher than prescribed doses    Long-term opioid use    Worse depression symptoms; less pleasure in things you usually enjoy    Feeling tired or sluggish    Slower thoughts or cloudy thinking    Being more sensitive to pain over time; pain is harder to control    Trouble sleeping or restless sleep    Changes in hormone levels (for example, less testosterone)    Changes in sex drive or ability to have sex    Constipation    Unsafe driving    Itching and sweating    Feeling dizzy    Nausea, vomiting and dry mouth    What else should I know about opioids?  When someone takes opioids for too long or too often, they become dependent. This means that if you stop or reduce the medicine too quickly, you will have withdrawal symptoms.    Dependence is not the same as  addiction. Addiction is when people keep using a substance that harms their body, their mind or their relations with others. If you have a history of drug or alcohol abuse, taking opioids can cause a relapse.    Over time, opioids don t work as well. Most people will need higher and higher doses. The higher the dose, the more serious the side effects. We don t know the long-term effects of opioids.      Prescribed opioids aren't the best way to manage chronic pain    Other ways to manage pain include:      Ibuprofen or acetaminophen.  You should always try this first.      Treat health problems that may be causing pain.      acupuncture or massage, deep breathing, meditation, visual imagery, aromatherapy.      Use heat or ice at the pain site      Physical therapy and exercise      Stop smoking      See a counselor or therapist                                                  People who have used opioids for a long time may have a lower quality of life, worse depression, higher levels of pain and more visits to doctors.    Never share your opioids with others. Be sure to store opioids in a secure place, locked if possible.Young children can easily swallow them and overdose.     You can overdose on opioids.  Signs of overdose include decrease or loss of consciousness, slowed breathing, trouble waking and blue lips.  If someone is worried about overdose, they should call 911.    If you are at risk for overdose, you may get naloxone (Narcan, a medicine that reverses the effects of opioids.  If you overdose, a friend or family member can give you Narcan while waiting for the ambulance.  They need to know the signs of overdose and how to give Narcan.    While you're taking opioids:    Don't use alcohol or street drugs. Taking them together can cause death.    Don't take any of these medicines unless your doctor says its okay.  Taking these with opioids can cause death.    Benzodiazepines (such as lorazepam         or  diazepam)    Muscle relaxers (such as cyclobenzaprine)    sleeping pills    other opioids    Safe disposal of opioids  Find your area drug take-back program, your pharmacy mail-back program, buy a special disposal bag (such as Deterra) from your pharmacy or flush them down the toilet.  Use the guidelines at:  www.fda.gov/drugs/resourcesforyou

## 2020-10-27 NOTE — PATIENT INSTRUCTIONS
[facility]   Post Procedure Instructions    Today you had:  trigger point injections   occipital nerve block   bursa injection      Medications used:  lidocaine   bupivicaine   kenolog   dexamethasone          Go to the emergency room if you develop any shortness of breath    Monitor the injection sites for signs and symptoms of infection-fever, chills, redness, swelling, warmth, or drainage to areas.    You may have soreness at injection sites for up to 24 hours.    You may apply ice to the painful areas to help minimize the discomfort of the needle pokes.    Do not apply heat to sites for at least 12 hours.    You may use anti-inflammatory medications or Tylenol for pain control if necessary    Pain Clinic phone number during work hours Monday-Friday:  [phone]    After hours provider line: [phone]

## 2020-10-27 NOTE — PROGRESS NOTES
[facility] - Procedure Note    Date of Service: 10/27/2020  Procedure performed: Right Greater Trochanteric Bursa Injection  Diagnosis: Right Trochanteric Bursitis  : Tiara Montesinos MD  Anesthesia: None  Complications: None    Indications:   Kobe Hanson Jr. is a 45 year old male who presents today with right hip pain due to right greater trochanteric bursitis.  They have a history of right sided proximal thigh pain into the right lateral thigh.  Exam shows tenderness to right greater trochanter bursa and they have tried conservative treatment including medications, exercise.    He had left sided greater trochanteric bursa injection on 9/15/2020 which provided good pain relief.     MRI was done on 8/26/2020 which showed:    FINDINGS:  Alignment is maintained. Bone marrow is within normal limits.  Multilevel mild disc height loss and mild facet arthropathy are  present as detailed below. The conus medullaris and cauda equina are  unremarkable. Paraspinal soft tissues are unremarkable.     Segmental Analysis:      T12-L1:  No significant spinal canal or foraminal stenosis.      L1-L2:  No significant spinal canal or foraminal stenosis.       L2-L3:  No significant spinal canal or foraminal stenosis.       L3-L4:  Disc bulge. Subtle posterior disc T2 hyperintensity is present  suggestive of mild annular fissure. Mild bilateral facet arthropathy.  Mild bilateral foraminal stenosis. Mild spinal canal stenosis.       L4-L5:  Disc bulge. Central disc protrusion is present with  curvilinear disc T2 hyperintensity consistent with annular fissure.  This probably contacts the traversing left L5 nerve root within the  lateral recess (series 5 image 23). Mild bilateral facet arthropathy.   Mild bilateral foraminal stenosis. Mild spinal canal stenosis.     L5-S1:  Disc bulge. Subtle posterior disc T2 hyperintensity is present  suggestive of mild annular fissure. Bulge is in close  proximity to the  traversing left S1 nerve root without evidence of displacement (series  5 image 30). Mild bilateral facet arthropathy. Mild bilateral  foraminal stenosis. No significant spinal canal stenosis.                                                                      IMPRESSION:    Degenerative disc disease at L3-L4, L4-L5, and L5-S1. Central disc  protrusion at L4-L5 with annular fissure. More subtle disc  abnormalities at L3-4 and L5-S1.    Options/alternatives, benefits and risks were discussed with the patient including bruising, infection and damage to nearby structures. Questions were answered to his satisfaction and he agrees to proceed. Voluntary informed consent was obtained and signed.     Vitals were reviewed: Yes  Allergies were reviewed:  Yes   Medications were reviewed:  Yes     Procedure:  After getting informed consent, patient was placed in a side lying position on the procedure table.   A Pause for the Cause was performed.  Patient was prepped and draped in sterile fashion.     The area over the right trochanter was palpated, and the tender area was identified, corresponding to the area of the trochanteric bursa.  The area was cleaned with Chloroprep.  A 25-gauge, 3-inch needle was inserted, aiming towards the trochanter.  When bone was encountered, the needle was slightly drawn.  A solution containing local anesthesic and steroid was injected.  The needle was removed.  Hemostasis was achieved. Bandaids were placed as appropriate.    In total, 1 ml of 0.5% bupivacaine, 1ml of lidocaine 1% and 1 ml (40 mg) of kenalog was injected.    Hemostasis was achieved, the area was cleaned, and bandaids were placed when appropriate.  The patient tolerated the procedure well, and was taken to the recovery room.    Images were saved to PACS.    Pre-procedure pain score: 3/10  Post-procedure pain score: 3/10     Assessment/Plan: Kobe YANIQUE Hanson Jr. is a 45 year old male s/p right greater  trochanteric bursa steroid injection today for hip pan.     1. Following today's procedure, the patient was advised to contact the [facility] for any of the following:   Fever, chills, or night sweats   New onset of pain, numbness, or weakness   Any questions/concerns regarding the procedure  If unable to contact the Pain Center, the patient was instructed to go to a local Emergency Room for any complications.   2. Injection can be repeated in ~3 months if helpful.       Tiara Montesinos MD  [facility] [place]

## 2020-11-04 ENCOUNTER — HOSPITAL ENCOUNTER (OUTPATIENT)
Dept: BEHAVIORAL HEALTH | Facility: CLINIC | Age: 45
Discharge: HOME | End: 2020-11-04
Attending: FAMILY MEDICINE | Admitting: FAMILY MEDICINE
Payer: COMMERCIAL

## 2020-11-04 PROCEDURE — 90791 PSYCH DIAGNOSTIC EVALUATION: CPT | Mod: 95 | Performed by: COUNSELOR

## 2020-11-04 NOTE — PROGRESS NOTES
"Mental Health Assessment Center  Evaluator Name:  WILVER Mccarty, [facility], [facility]    PATIENT'S NAME: Kobe Hanson Jr.  PREFERRED NAME: Saqib  PRONOUNS:     He/him  MRN:   [id number]  :   1975   ACCT. NUMBER: [id number]  DATE OF SERVICE: 20  START TIME: 4:00pm  END TIME: 5:10pm  PREFERRED PHONE: [phone]  May we leave a program related message:   SERVICE MODALITY:  Video Visit:    Telemedicine Visit: The patient's condition can be safely assessed and treated via synchronous audio and visual telemedicine encounter.      Reason for Telemedicine Visit: Services only offered telehealth    Originating Site (Patient Location): Patient's car    Distant Site (Provider Location): Provider Remote Setting    Consent:  The patient/guardian has verbally consented to: the potential risks and benefits of telemedicine (video visit) versus in person care; bill my insurance or make self-payment for services provided; and responsibility for payment of non-covered services.     Patient would like the video invitation sent by: Send to e-mail at: [email or web address]    Mode of Communication:  Video Conference via well    As the provider I attest to compliance with applicable laws and regulations related to telemedicine.    UNIVERSAL ADULT DIAGNOSTIC ASSESSMENT      Identifying Information:  Patient is a 45 year old.  The pronoun use throughout this assessment reflects the patient's chosen pronoun.  Patient was referred for an assessment by self.  Patient attended the session alone.     Chief Complaint:   The reason for seeking services at this time is: \"Trying to determine if I'm long-term depressed\" related to his sexuality and functioning. He was doing really well and moved to [place] in  to take a position by his father and brother and took a pay cut. 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 , his father  from liver failure and overweight and ate " poorly. He tried to start transition conversations. His father made all financial decisions. 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 Brother Jason run finances and [employer]. They increased his salary. He put his wife and 2 daughters on back burner. He was out of town often. In 11/2017, he was getting tired and wife ready to leave by 04/2018. He needed another manager. He added revenue and took on responsibilities. They offered him another job within the company and he felt cut out of his job after 4 years of working. And his mother supported leaving him out of the business. His brother railroaded him to his mother and father. Father said mother had to do what ever brother said. He was cut off from the family. He went to counseling in 06/2018 to deal with the family issues. Her mother said that they didn't appreciate him since he was 17 because of things he said to them at that age. He considered himself depressed because he wanted to get away for a month. He felt much better after 3 or 4 months of counseling from 06/2020 until 11/2020. He also did marital counseling. He put work in front of his family and he reconciled with his wife. Kids born in 2008 and 2010. They returned to healthy sex life and communication. 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 He thought he would have support. He went to trade shows and spent his own money. He still works there. He got no support. He did this for \6 months and made little revenue and talked with his brother about it in mid-2019, but still ended year low. [employer] company and 2020 was good. In 02/2020, no raise, sales down, and told him to start looking for other jobs. His brother tried to fire him, but give him run around. Since 06/2020, he has been applying for other jobs but the market is saturated. This causes disinterest and depression. He had tons of industry connections in [place] and 10 years behind the ball in the industry. He has a  non-compete 150 miles. He has had 4 interviews and not got them. He works in sales. His brother is a narcissist like Ernst Hanson. His brother is 3 years younger. He is in a dead end at his job. He has trapped himself. He realizes he won't be able to successful with them. His wife has a great job now and makes more than him.      He takes courses online to get better, but they are starting to feel like a waste. He always believed in himself. He made an appointment because he is becoming detrimental to himself. He doesn't believe he knows he enough.     He and his wife had a solid relationship until 2019. They are very different people. He likes going out and he enjoys having friends and having cocktails. His wife is happy staying at home and watching a movie and bed early. She would bad mouth him because he was out a lot. He gave up things he enjoyed and he stopped expressing who he was. He has been  15 years. His wife's mother has been  4 times after 15 years. His relationship has been dying. His wife is on a weight rollercoaster and diets. They addressed it in couple's counseling. He can't bring it up. He met her when she was 20 years old. He was aged 25. She was virgin and not sexual. They were good friends since they met. He wasn't going to  her. They now have sex once per month. Since 03/2020, he couldn't take care of things himself and it had been daily.     In therapy in 2018, he had a big ego and had tons of confidence and never questioned things. He never cheated on his wife. He would do things well. In 2018, he started to understand that he is not the best at anything. He realized he was obvilivious to many things. He started questioning everything. His therapist told him to go to other at [place]. He was exposed to [employer] and advanced magazines at age 9. At age 11, he took a trip to his aunt's in [place]. She wore very revealing clothing. She came in a took of her clothes  infront of him and she forgot he was there. She threw clothes on him. He forgot about it until 2019. He put on the thong undewear and masturbated. He spent 8 years  Collecting girlfriends panties and wearing them and masturbaging thme. He felt connected to them. As a senior in high school, his mother caught him. He iddn't think about it again. He then was  and he spent lots of money on her lingerie. He would wear it sometimes. He never considered cross dressing. He has always detested his body hair and got hair removal 5 years ago. Around holidays last year, He has been addicted to porn and enjoyed looking at penis bigger than his. He also enjoyed anal stiumlation. He katie masturbated twice per year.     Until end of  or since 2020 and not enjoy his wife or himself, his sex drive disappeared.     He has a PCP and willing to get tested for testosterone.     He doesn't think his wife would be accepting. He is in the car so his wife won't hear. [place] He hasn't cheated and can do what he wants. He dresses up and feels really good. He says he will tell his wife but 3.5 hours later can't. He feels like he is hiding.      He has never quit anything and never gives up.         Social/Family History:  Patient's parents remained  until his father  in . He has one younger brother. .      The patient describes their cultural background as .  Cultural influences and impact on patient's life structure, values, norms, and healthcare: None.  Contextual influences on patient's health include: None.    These factors will be addressed in the Preliminary Treatment plan.  Patient identified their preferred language to be English. Patient reported they does not need the assistance of an  or other support involved in therapy.     Patient's highest education level was college graduate. Patient identified the following learning problems: none reported.   Modifications will not be used to assist communication in therapy. Patient reports they are  able to understand written materials.    Patient's current relationship status is .   Patient identified their sexual orientation as questioning if he is bi-sexual.  Patient reported having two child(leena). Patient identified no one as part of their support system.  Patient identified the quality of these relationships as poor.      Patient lives with his wife and children. Housing is stable.     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  Patient reports their finances are obtained through employment and spouse.  Patient does not identify finances as a current stressor.      Patient did not indicate that he has been involved with the legal system.      Patient's Strengths and Limitations:  Patient identified the following strengths or resources that will help them succeed in treatment: exercise routine, intelligence, motivation, strong social skills and work ethic. Things that may interfere with the patient's success in treatment include: lack of family support and lack of social support.     Personal and Family Medical History:   Patient does not report a family history of mental health concerns.  Patient reports family history includes Hypertension in his father..     Patient does report Mental Health Diagnosis and/or Treatment.  Patient Patient reported the following previous diagnoses which include(s): none reported.   Psychiatric Hospitalizations: None.  Patient denies a history of civil commitment.  Currently, patient is not receiving other mental health services. .     Patient has not had a physical exam to rule out medical causes for current symptoms.  Date of last physical exam was greater than a year ago and client was encouraged to schedule an exam with PCP. The patient has a [place] Primary Care Provider, who is named Santos Jimenez.  Patient  reports no current medical concerns.  There are not significant appetite / nutritional concerns / weight changes.   Patient does not report a history of head injury / trauma / cognitive impairment.      Current Outpatient Medications   Medication     ibuprofen (ADVIL/MOTRIN) 200 MG capsule     Medication Adherence:  Patient reports taking prescribed medications as prescribed.    Patient Allergies:    Allergies   Allergen Reactions     Dust Mite Extract      Mold      Trees        Medical History:  No past medical history on file.      Current Mental Status Exam:   Appearance:  Appropriate    Eye Contact:  Good   Psychomotor:  Normal       Gait / station:  Sitting  Attitude / Demeanor: Cooperative  Friendly Pleasant  Speech      Rate / Production: Normal/ Responsive      Volume:  Normal  volume      Language:  intact  Mood:   Anxious   Affect:   Appropriate    Thought Content: Clear   Thought Process: Coherent       Associations: No loosening of associations  Insight:   Fair   Judgment:  Intact   Orientation:  All  Attention/concentration: Good    Rating Scales:    PHQ9:    PHQ-9 SCORE 11/3/2020   PHQ-9 Total Score MyChart 7 (Mild depression)   PHQ-9 Total Score 7   ;    GAD7:    TRACY-7 SCORE 11/3/2020   Total Score 4 (minimal anxiety)   Total Score 4     Clinical Global Impressions  First:  Considering your total clinical experience with this particular patient population, how severe are the patient's symptoms at this time?: 5 (11/4/2020  4:45 PM)  Most recent:  Compared to the patient's condition at the START of treatment, this patient's condition is: 4 (11/4/2020  4:45 PM)    Substance Use:  Patient did not report a family history of substance use concerns; see medical history section for details.  Patient has not received chemical dependency treatment in the past.       Patient does not report alcohol use.  Patient does not report nicotine use.  Patient does not report marijuana use.  Patient does not report  cannabis use.  Patient reports using/abusing the fol    Significant Losses / Trauma / Abuse / Neglect Issues:   Patient did not serve in the .  There are indications or report of significant loss, trauma, abuse or neglect issues related to: death of father, family tension with business.  Concerns for possible neglect are not present.     Safety Assessment:   Current Safety Concerns:  [place] Suicide Severity Rating Scale (Short Version)  [place] Suicide Severity Rating (Short Version) 11/4/2020   Over the past 2 weeks have you felt down, depressed, or hopeless? yes   Over the past 2 weeks have you had thoughts of killing yourself? no   Have you ever attempted to kill yourself? no     Patient denies current homicidal ideation and behaviors.  Patient denies current self-injurious ideation and behaviors.    Patient denied risk behaviors associated with substance use.  Patient denies any high risk behaviors associated with mental health symptoms.  Patient reports the following current concerns for their personal safety: None.    History of Safety Concerns:  Patient denied a history of homicidal ideation.     Patient denied a history of personal safety concerns.    Patient denied a history of assaultive behaviors.    Patient denied a history of sexual assault behaviors.     Patient denied a history of risk behaviors associated with substance use.  Patient denies any history of high risk behaviors associated with mental health symptoms.  Patient reports the following protective factors: living with other people and daily obligations    Risk Plan:  See Recommendations for Safety and Risk Management Plan    Review of Symptoms per patient report:  Depression: Lack of interest, Change in energy level, Feelings of hopelessness, Low self-worth, Ruminations and Feeling sad, down, or depressed  Devorah:  No Symptoms  Psychosis: No Symptoms  Anxiety: Excessive worry, Sleep disturbance and Ruminations  Panic:  No  symptoms  Post Traumatic Stress Disorder:  No Symptoms   Eating Disorder: No Symptoms  ADD / ADHD:  No symptoms  Conduct Disorder: No symptoms  Autism Spectrum Disorder: No symptoms  Obsessive Compulsive Disorder: No Symptoms    Diagnostic Criteria:   The client does not report enough symptoms for the full criteria of any specific Anxiety Disorder to have been met  Client reports the following symptoms of anxiety:   - Excessive anxiety and worry about a number of events or activities (such as work or school performance).    - The person finds it difficult to control the worry.   - Irritability.       Functional Status:  Patient reports the following functional impairments: self-care and work / vocational responsibilities.     WHODAS:   WHODAS 2.0 Total Score 11/3/2020   Total Score 15   Total Score MyChart 15       Clinical Summary:  1. Reason for assessment: patient wanted direction on what to do.  2. Psychosocial, Cultural and Contextual Factors: None identified  3. Principal DSM5 Diagnoses  (Sustained by DSM5 Criteria Listed Above):   300.00 (F41.9) Unspecified Anxiety Disorder    4. Other Diagnoses that is relevant to services:     5. Provisional Diagnosis:     6. Prognosis: Relieve Acute Symptoms  7. Likely consequences of symptoms if not treated: Patient may need higher level of care  8. Client strengths include:  caring, educated, employed, goal-focused, insightful, intelligent and motivated      Recommendations:     1. Plan for Safety and Risk Management:   Recommended that patient call 911 or go to the local ED should there be a change in any of these risk factors..          Report to child / adult protection services was NA.      2. Patient did not identify concerns with a cultural influence.     3. Initial Treatment will focus on: Anxiety.     4. Resources/Service Plan:    services are not indicated.   Modifications to assist communication are not indicated.   Additional disability  accommodations are not indicated.      5. Collaboration:  Collaboration / coordination of treatment will be initiated with the following support professionals: None     6.  Referrals:   The following referral(s) will be initiated: Patient will schedule testing and labwork with his doctor. He will contact [facility] if wants individual therapy.     7. ELBERT: . Patient did not indicate a history of opiate use..     8. Records were reviewed at time of assessment. Information in this assessment was obtained from the medical record and provided by patient who is a good historian. Patient will have open access to their mental health medical record.      Eval type:  Mental Health    Provider Name/ Credentials:  WILVER Mccarty, CHELY, HERMAN  November 4, 2020

## 2020-11-12 ENCOUNTER — VIRTUAL VISIT (OUTPATIENT)
Dept: INTERNAL MEDICINE | Facility: CLINIC | Age: 45
End: 2020-11-12
Payer: COMMERCIAL

## 2020-11-12 DIAGNOSIS — R68.82 DECREASED SEX DRIVE: Primary | ICD-10-CM

## 2020-11-12 DIAGNOSIS — R03.0 ELEVATED BP WITHOUT DIAGNOSIS OF HYPERTENSION: ICD-10-CM

## 2020-11-12 DIAGNOSIS — M79.10 MYALGIA: ICD-10-CM

## 2020-11-12 PROCEDURE — 99213 OFFICE O/P EST LOW 20 MIN: CPT | Mod: 95 | Performed by: INTERNAL MEDICINE

## 2020-11-12 NOTE — PROGRESS NOTES
"Kobe Hanson Jr. is a 45 year old male who is being evaluated via a billable telephone visit.      The patient has been notified of following:     \"This telephone visit will be conducted via a call between you and your physician/provider. We have found that certain health care needs can be provided without the need for a physical exam.  This service lets us provide the care you need with a short phone conversation.  If a prescription is necessary we can send it directly to your pharmacy.  If lab work is needed we can place an order for that and you can then stop by our lab to have the test done at a later time.    Telephone visits are billed at different rates depending on your insurance coverage. During this emergency period, for some insurers they may be billed the same as an in-person visit.  Please reach out to your insurance provider with any questions.    If during the course of the call the physician/provider feels a telephone visit is not appropriate, you will not be charged for this service.\"    Patient has given verbal consent for Telephone visit?  Yes    What phone number would you like to be contacted at? [phone]    How would you like to obtain your AVS? Arnie Rosenbaum     Kobe Hanson Jr. is a 45 year old male who presents via phone visit today for the following health issues:    HPI        Patient would like to discuss getting testing for testosterone levels due to issues of mild ED and decreases sexual drive.  Symptoms noted for about the last year or so.    Abdominal/Flank Pain  Onset/Duration: 1 month   Description:   Character: Dull ache, tender   Location: RUQ   Radiation: None  Intensity: mild  Progression of Symptoms:  same  Accompanying Signs & Symptoms:  Fever/Chills: no  Gas/Bloating: no  Nausea: no  Vomitting: no  Diarrhea: no  Constipation: no  Dysuria or Hematuria: no  History:   Trauma: no  Previous similar pain: YES- patient states similar pain in the past when he " was diagnosed with fatty liver.  Previous tests done: none  Precipitating factors:   Does the pain change with:     Food: YES- worse when patient feels full     Bowel Movement: no    Urination: no   Other factors:  no  Therapies tried and outcome: None    Other concerns:  1. Patient has been receiving injections at the pain management clinic over the last few months- has been told by clinic that his BP readings are mildly elevated when they are checking     Review of Systems   CONSTITUTIONAL: NEGATIVE for fever, chills, change in weight  EYES: NEGATIVE for vision changes or irritation  ENT/MOUTH: NEGATIVE for ear, mouth and throat problems  RESP: NEGATIVE for significant cough or SOB  CV: NEGATIVE for chest pain, palpitations or peripheral edema  : NEGATIVE for frequency, dysuria, or hematuria  NEURO: NEGATIVE for weakness, dizziness or paresthesias  HEME: NEGATIVE for bleeding problems  PSYCHIATRIC: NEGATIVE for changes in mood or affect        Objective      Vitals:  No vitals were obtained today due to virtual visit.    healthy, alert and no distress  PSYCH: Alert and oriented times 3; coherent speech, normal   rate and volume, able to articulate logical thoughts, able   to abstract reason, no tangential thoughts, no hallucinations   or delusions  His affect is normal  RESP: No cough, no audible wheezing, able to talk in full sentences  Remainder of exam unable to be completed due to telephone visits        Assessment/Plan:      Decreased sex drive  Reason placed for testosterone levels.  Discussed options available with potential need to see urology.  - **Testosterone Free and Total FUTURE anytime; Future  - UROLOGY ADULT REFERRAL; Future    Myalgia  Suspect related to recent viral infection, improving.  Noted recent labs negative last IgG antibody for parvovirus.  Discussed relationship to abdominal symptoms with noted recent liver function tests.  Suggest observation and follow-up    Elevated BP without  diagnosis of hypertension  Discussed monitoring blood pressure at home.  Patient will follow up in clinic in 7 to 10 days for reassessment.       BMI:   Estimated body mass index is 27.67 kg/m  as calculated from the following:    Height as of 9/8/20: 1.829 m (6').    Weight as of 9/16/20: 92.5 kg (204 lb).            See Patient Instructions    Return in about 2 weeks (around 11/26/2020) for BP Recheck, if symptoms recur or worsen.    Santos Jimenez MD  [facility]    Phone call duration:  14 minutes

## 2020-11-20 NOTE — TELEPHONE ENCOUNTER
MEDICAL RECORDS REQUEST   [facility]  Urology Clinic  [address] [place] [address]  PHONE: [phone]  Fax: [phone]        FUTURE VISIT INFORMATION                                                   Kobe Hanson Jr., : 1975 scheduled for future visit at [facility]    APPOINTMENT INFORMATION:    Date: 2021 7:30AM    Provider:  Dima Lara MD    Reason for Visit/Diagnosis: Decreased libido     REFERRAL INFORMATION:    Referring provider:  CRISTAL RIVAS    Specialty: MD    Referring providers clinic:      Clinic contact number:  N/A    RECORDS REQUESTED FOR VISIT                                                     NOTES  STATUS/DETAILS   OFFICE NOTE from referring provider  yes   OFFICE NOTE from other specialist  no   DISCHARGE SUMMARY from hospital  no   DISCHARGE REPORT from the ER  no   OPERATIVE REPORT  no   MEDICATION LIST  no     PRE-VISIT CHECKLIST      Record collection complete Yes   Appointment appropriately scheduled           (right time/right provider) Yes   MyChart activation Yes   Questionnaire complete If no, please explain: In process      Completed by: Vi Mejias

## 2020-11-25 ENCOUNTER — OFFICE VISIT (OUTPATIENT)
Dept: DERMATOLOGY | Facility: CLINIC | Age: 45
End: 2020-11-25
Payer: COMMERCIAL

## 2020-11-25 VITALS — DIASTOLIC BLOOD PRESSURE: 97 MMHG | SYSTOLIC BLOOD PRESSURE: 142 MMHG

## 2020-11-25 DIAGNOSIS — D22.9 MULTIPLE BENIGN NEVI: ICD-10-CM

## 2020-11-25 DIAGNOSIS — L82.1 SEBORRHEIC KERATOSES: ICD-10-CM

## 2020-11-25 DIAGNOSIS — L81.4 LENTIGINES: ICD-10-CM

## 2020-11-25 DIAGNOSIS — L30.9 DERMATITIS: Primary | ICD-10-CM

## 2020-11-25 DIAGNOSIS — Z80.8 FAMILY HX OF MELANOMA: ICD-10-CM

## 2020-11-25 DIAGNOSIS — L21.9 DERMATITIS, SEBORRHEIC: ICD-10-CM

## 2020-11-25 DIAGNOSIS — L29.9 LOCALIZED PRURITUS: ICD-10-CM

## 2020-11-25 PROCEDURE — 99214 OFFICE O/P EST MOD 30 MIN: CPT | Performed by: PHYSICIAN ASSISTANT

## 2020-11-25 RX ORDER — DESONIDE 0.5 MG/G
CREAM TOPICAL 2 TIMES DAILY
Qty: 60 G | Refills: 0 | Status: SHIPPED | OUTPATIENT
Start: 2020-11-25

## 2020-11-25 RX ORDER — TRIAMCINOLONE ACETONIDE 1 MG/G
CREAM TOPICAL 2 TIMES DAILY
Qty: 60 G | Refills: 1 | Status: SHIPPED | OUTPATIENT
Start: 2020-11-25

## 2020-11-25 NOTE — PROGRESS NOTES
HPI:  Kobe Hanson Jr. is a 45 year old male patient here today for rash on eyelids and chest .  Patient states this has been present for on and off for years.  Patient reports the following symptoms: itch and rash. Comes and goes 3-4x a week for a few weeks at a time .  Patient reports the following previous treatments: topical TAC and desonide with improvement.  Patient reports the following modifying factors: none.  Associated symptoms: none.  Patient has no other skin complaints today.  Remainder of the HPI, Meds, PMH, Allergies, FH, and SH was reviewed in chart.    Pertinent Hx:   Maternal grandfather had MM.   History reviewed. No pertinent past medical history.    Past Surgical History:   Procedure Laterality Date     VASECTOMY  2010        Family History   Problem Relation Age of Onset     Hypertension Father      Melanoma Maternal Grandfather        Social History     Socioeconomic History     Marital status:      Spouse name: Not on file     Number of children: Not on file     Years of education: Not on file     Highest education level: Not on file   Occupational History     Employer: [employer]   Social Needs     Financial resource strain: Not on file     Food insecurity     Worry: Not on file     Inability: Not on file     Transportation needs     Medical: Not on file     Non-medical: Not on file   Tobacco Use     Smoking status: Former Smoker     Quit date: 2/3/2007     Years since quittin.8     Smokeless tobacco: Never Used   Substance and Sexual Activity     Alcohol use: Yes     Drug use: No     Sexual activity: Yes     Partners: Female     Birth control/protection: Surgical   Lifestyle     Physical activity     Days per week: Not on file     Minutes per session: Not on file     Stress: Not on file   Relationships     Social connections     Talks on phone: Not on file     Gets together: Not on file     Attends Mu-ism service: Not on file     Active member of club or organization:  Not on file     Attends meetings of clubs or organizations: Not on file     Relationship status: Not on file     Intimate partner violence     Fear of current or ex partner: Not on file     Emotionally abused: Not on file     Physically abused: Not on file     Forced sexual activity: Not on file   Other Topics Concern     Parent/sibling w/ CABG, MI or angioplasty before 65F 55M? Not Asked   Social History Narrative     Not on file       Outpatient Encounter Medications as of 11/25/2020   Medication Sig Dispense Refill     ibuprofen (ADVIL/MOTRIN) 200 MG capsule Take 200-800 mg by mouth as needed for fever        No facility-administered encounter medications on file as of 11/25/2020.        Review Of Systems:  Skin: rash  Eyes: negative  Ears/Nose/Throat: negative  Respiratory: No shortness of breath, dyspnea on exertion, cough, or hemoptysis  Cardiovascular: negative  Gastrointestinal: negative  Genitourinary: negative  Musculoskeletal: negative  Neurologic: negative  Psychiatric: negative  Hematologic/Lymphatic/Immunologic: negative  Endocrine: negative      Objective:     BP (!) 142/97   Eyes: Conjunctivae/lids: Normal   ENT: Lips:  Normal  MSK: Normal  Cardiovascular: Peripheral edema none  Pulm: Breathing Normal  Neuro/Psych: Orientation: A/O x 3 Normal; Mood/Affect: Normal, NAD, WDWN  Pt accompanied by: self  Following areas examined: face( wearing mask), neck, chest  Davila skin type:ii   Findings:  Normal appearing eyelids and chest  Estrella WD smooth macules on back.  Brown, stuck-on scaly appearing papules on back and left flank  Well circumscribed macules with symmetric color distribution on back    Assessment and Plan:  1) eyelid dermatitis? vs seborrheic dermatitis  No rash today  Apply a thin layer of desonide to affected area 2x a day for 2 weeks. Tapering with improvement. Do not use on face or body folds.  Consider vaseline over the top of the desonide.  Discussed side effects of topical steroids  including but not limited to atrophy (skin thinning), striae (stretch marks) telangiectasias, steroid acne, and others. Do not apply to normal skin. Do not apply to discolored skin that does not have rash present. Educated patient on post inflammatory hyperpigmentation.     2) seborrheic dermatitis vs dermatitis, localized pruritis  No rash today  Apply a thin layer of triamcinolone 1% to affected area 2x a day for 2 weeks. Tapering with improvement. Do not use on face or body folds.  Discussed side effects of topical steroids including but not limited to atrophy (skin thinning), striae (stretch marks) telangiectasias, steroid acne, and others. Do not apply to normal skin. Do not apply to discolored skin that does not have rash present. Educated patient on post inflammatory hyperpigmentation.     3) family history of MM  Signs and Symptoms of non-melanoma skin cancer and ABCDEs of melanoma reviewed with patient. Patient encouraged to perform monthly self skin exams and educated on how to perform them. UV precautions reviewed with patient. Patient was asked about new or changing moles/lesions on body.   Wear a sunscreen with at least SPF 30 on your face, ears, neck and V of the chest daily. Wear sunscreen on other areas of the body if those areas are exposed to the sun throughout the day. Sunscreens can contain physical and/or chemical blockers. Physical blockers are less likely to clog pores, these include zinc oxide and titanium dioxide. Reapply every two hour and after swimming. Sunscreen examples include Neutrogena, CeraVe, Blue Lizard, Elta MD and many others.    4) Lentigines, multiple benign nevi,and seborrheic keratoses    Treatment of these lesions would be purely cosmetic and not medically neccessary.  Lesion may recur and/or may not completely resolve. May need additional treatment.  Different removal options including excision, cryotherapy, cautery and /or laser.        Bill to follow up with Primary Care  provider regarding elevated blood pressure.      Follow up in yearly FBE

## 2020-11-25 NOTE — PATIENT INSTRUCTIONS
Proper skin care from [facility]:    -Eliminate harsh soaps as they strip the natural oils from the skin, often resulting in dry itchy skin ( i.e. Dial, Zest, Risa Spring)  -Use mild soaps such as Cetaphil or Dove Sensitive Skin in the shower. You do not need to use soap on arms, legs, and trunk every time you shower unless visibly soiled.   -Avoid hot or cold showers.  -After showering, lightly dry off and apply moisturizing within 2-3 minutes. This will help trap moisture in the skin.   -Aggressive use of a moisturizer at least 1-2 times a day to the entire body (including -Vanicream, Cetaphil, Aquaphor or Cerave) and moisturize hands after every washing.  -We recommend using moisturizers that come in a tub that needs to be scooped out, not a pump. This has more of an oil base. It will hold moisture in your skin much better than a water base moisturizer. The above recommended are non-pore clogging.      Wear a sunscreen with at least SPF 30 on your face, ears, neck and V of the chest daily. Wear sunscreen on other areas of the body if those areas are exposed to the sun throughout the day. Sunscreens can contain physical and/or chemical blockers. Physical blockers are less likely to clog pores, these include zinc oxide and titanium dioxide. Reapply every two hour and after swimming. Sunscreen examples include Neutrogena, CeraVe, Blue Lizard, Elta MD and many others.    UV radiation  UVA radiation remains constant throughout the day and throughout the year. It is a longer wavelength than UVB and therefore penetrates deeper into the skin leading to immediate and delayed tanning, photoaging, and skin cancer. 70-80% of UVA and UVB radiation occurs between the hours of 10am-2pm.  UVB radiation  UVB radiation causes the most harmful effects and is more significant during the summer months. However, snow and ice can reflect UVB radiation leading to skin damage during the winter months as well. UVB radiation is  responsible for tanning, burning, inflammation, delayed erythema (pinkness), pigmentation (brown spots), and skin cancer.     I recommend self monthly full body exams and yearly full body exams with a dermatology provider. If you develop a new or changing lesion please follow up for examination. Most skin cancers are pink and scaly or pink and pearly. However, we do see blue/brown/black skin cancers.  Consider the ABCDEs of melanoma when giving yourself your monthly full body exam ( don't forget the groin, buttocks, feet, toes, etc). A-asymmetry, B-borders, C-color, D-diameter, E-elevation or evolving. If you see any of these changes please follow up in clinic. If you cannot see your back I recommend purchasing a hand held mirror to use with a larger wall mirror.            1) eyelid dermatitis? vs seborrheic dermatitis  No rash today  Apply a thin layer of desonide to affected area 2x a day for 2 weeks. Tapering with improvement. Do not use on face or body folds.  Consider vaseline over the top of the desonide.  Discussed side effects of topical steroids including but not limited to atrophy (skin thinning), striae (stretch marks) telangiectasias, steroid acne, and others. Do not apply to normal skin. Do not apply to discolored skin that does not have rash present. Educated patient on post inflammatory hyperpigmentation.       2) seborrheic dermatitis vs dermatitis  No rash today  Apply a thin layer of triamcinolone to affected area 2x a day for 2 weeks. Tapering with improvement. Do not use on face or body folds.  Discussed side effects of topical steroids including but not limited to atrophy (skin thinning), striae (stretch marks) telangiectasias, steroid acne, and others. Do not apply to normal skin. Do not apply to discolored skin that does not have rash present. Educated patient on post inflammatory hyperpigmentation.

## 2020-11-25 NOTE — LETTER
2020         RE: Kobe Hanson Jr.  [address]  [facility] [address]        Dear Colleague,    Thank you for referring your patient, Kobe Hanson Jr., to the [facility]. Please see a copy of my visit note below.    HPI:  Kobe Hanson Jr. is a 45 year old male patient here today for rash on eyelids and chest .  Patient states this has been present for on and off for years.  Patient reports the following symptoms: itch and rash. Comes and goes 3-4x a week for a few weeks at a time .  Patient reports the following previous treatments: topical TAC and desonide with improvement.  Patient reports the following modifying factors: none.  Associated symptoms: none.  Patient has no other skin complaints today.  Remainder of the HPI, Meds, PMH, Allergies, FH, and SH was reviewed in chart.    Pertinent Hx:   Maternal grandfather had MM.   History reviewed. No pertinent past medical history.    Past Surgical History:   Procedure Laterality Date     VASECTOMY  2010        Family History   Problem Relation Age of Onset     Hypertension Father      Melanoma Maternal Grandfather        Social History     Socioeconomic History     Marital status:      Spouse name: Not on file     Number of children: Not on file     Years of education: Not on file     Highest education level: Not on file   Occupational History     Employer: [employer]   Social Needs     Financial resource strain: Not on file     Food insecurity     Worry: Not on file     Inability: Not on file     Transportation needs     Medical: Not on file     Non-medical: Not on file   Tobacco Use     Smoking status: Former Smoker     Quit date: 2/3/2007     Years since quittin.8     Smokeless tobacco: Never Used   Substance and Sexual Activity     Alcohol use: Yes     Drug use: No     Sexual activity: Yes     Partners: Female     Birth control/protection: Surgical   Lifestyle     Physical activity      Days per week: Not on file     Minutes per session: Not on file     Stress: Not on file   Relationships     Social connections     Talks on phone: Not on file     Gets together: Not on file     Attends Rastafari service: Not on file     Active member of club or organization: Not on file     Attends meetings of clubs or organizations: Not on file     Relationship status: Not on file     Intimate partner violence     Fear of current or ex partner: Not on file     Emotionally abused: Not on file     Physically abused: Not on file     Forced sexual activity: Not on file   Other Topics Concern     Parent/sibling w/ CABG, MI or angioplasty before 65F 55M? Not Asked   Social History Narrative     Not on file       Outpatient Encounter Medications as of 11/25/2020   Medication Sig Dispense Refill     ibuprofen (ADVIL/MOTRIN) 200 MG capsule Take 200-800 mg by mouth as needed for fever        No facility-administered encounter medications on file as of 11/25/2020.        Review Of Systems:  Skin: rash  Eyes: negative  Ears/Nose/Throat: negative  Respiratory: No shortness of breath, dyspnea on exertion, cough, or hemoptysis  Cardiovascular: negative  Gastrointestinal: negative  Genitourinary: negative  Musculoskeletal: negative  Neurologic: negative  Psychiatric: negative  Hematologic/Lymphatic/Immunologic: negative  Endocrine: negative      Objective:     BP (!) 142/97   Eyes: Conjunctivae/lids: Normal   ENT: Lips:  Normal  MSK: Normal  Cardiovascular: Peripheral edema none  Pulm: Breathing Normal  Neuro/Psych: Orientation: A/O x 3 Normal; Mood/Affect: Normal, NAD, WDWN  Pt accompanied by: self  Following areas examined: face( wearing mask), neck, chest  Davila skin type:ii   Findings:  Normal appearing eyelids and chest  Estrella WD smooth macules on back.  Brown, stuck-on scaly appearing papules on back and left flank  Well circumscribed macules with symmetric color distribution on back    Assessment and Plan:  1) eyelid  dermatitis? vs seborrheic dermatitis  No rash today  Apply a thin layer of desonide to affected area 2x a day for 2 weeks. Tapering with improvement. Do not use on face or body folds.  Consider vaseline over the top of the desonide.  Discussed side effects of topical steroids including but not limited to atrophy (skin thinning), striae (stretch marks) telangiectasias, steroid acne, and others. Do not apply to normal skin. Do not apply to discolored skin that does not have rash present. Educated patient on post inflammatory hyperpigmentation.     2) seborrheic dermatitis vs dermatitis, localized pruritis  No rash today  Apply a thin layer of triamcinolone 1% to affected area 2x a day for 2 weeks. Tapering with improvement. Do not use on face or body folds.  Discussed side effects of topical steroids including but not limited to atrophy (skin thinning), striae (stretch marks) telangiectasias, steroid acne, and others. Do not apply to normal skin. Do not apply to discolored skin that does not have rash present. Educated patient on post inflammatory hyperpigmentation.     3) family history of MM  Signs and Symptoms of non-melanoma skin cancer and ABCDEs of melanoma reviewed with patient. Patient encouraged to perform monthly self skin exams and educated on how to perform them. UV precautions reviewed with patient. Patient was asked about new or changing moles/lesions on body.   Wear a sunscreen with at least SPF 30 on your face, ears, neck and V of the chest daily. Wear sunscreen on other areas of the body if those areas are exposed to the sun throughout the day. Sunscreens can contain physical and/or chemical blockers. Physical blockers are less likely to clog pores, these include zinc oxide and titanium dioxide. Reapply every two hour and after swimming. Sunscreen examples include Neutrogena, CeraVe, Blue Lizard, Elta MD and many others.    4) Lentigines, multiple benign nevi,and seborrheic keratoses    Treatment of  these lesions would be purely cosmetic and not medically neccessary.  Lesion may recur and/or may not completely resolve. May need additional treatment.  Different removal options including excision, cryotherapy, cautery and /or laser.        Bill to follow up with Primary Care provider regarding elevated blood pressure.      Follow up in yearly FBE        Again, thank you for allowing me to participate in the care of your patient.        Sincerely,        Fallon Monitel PA-C

## 2020-11-25 NOTE — NURSING NOTE
Pt defers bp recheck in ov. Pt voiced he checks it all the time at home and is on bp medication.    Vi Ellis MA

## 2020-11-30 DIAGNOSIS — R68.82 DECREASED SEX DRIVE: ICD-10-CM

## 2020-11-30 PROCEDURE — 99000 SPECIMEN HANDLING OFFICE-LAB: CPT | Performed by: INTERNAL MEDICINE

## 2020-11-30 PROCEDURE — 84270 ASSAY OF SEX HORMONE GLOBUL: CPT | Performed by: INTERNAL MEDICINE

## 2020-11-30 PROCEDURE — 84403 ASSAY OF TOTAL TESTOSTERONE: CPT | Mod: 90 | Performed by: INTERNAL MEDICINE

## 2020-12-01 LAB
SHBG SERPL-SCNC: 16 NMOL/L (ref 11–80)
TESTOST FREE SERPL-MCNC: 11.49 NG/DL (ref 4.7–24.4)
TESTOST SERPL-MCNC: 401 NG/DL (ref 240–950)

## 2020-12-14 ENCOUNTER — MYC MEDICAL ADVICE (OUTPATIENT)
Dept: PALLIATIVE MEDICINE | Facility: CLINIC | Age: 45
End: 2020-12-14

## 2020-12-14 DIAGNOSIS — M70.61 TROCHANTERIC BURSITIS OF BOTH HIPS: Primary | ICD-10-CM

## 2020-12-14 DIAGNOSIS — M70.62 TROCHANTERIC BURSITIS OF BOTH HIPS: Primary | ICD-10-CM

## 2020-12-15 NOTE — TELEPHONE ENCOUNTER
Flavio below from pt as well    You are correct,  the left hip and lower back have much more pain that the right side. I will try to get through the day without ibuprofen today to see how the right feels when I'm not using any pain killers. Ideally I would like them both done to get on the same schedule and so that I can better understand where the pain is coming from as the injections wear off.

## 2020-12-15 NOTE — TELEPHONE ENCOUNTER
[employer] message sent to patient:  Good Morning BillDr Montesinos did review your [employer] message.  She placed the order for you to have bilateral trochanteric bursa injections (hips).  Our scheduling department will be giving you a call to set up these injections.  If you don't hear from them soon, you can call [phone].  Hope you have a great day.    Take Care,    Claudette Henson RN  Care Coordinator  [facility]

## 2020-12-15 NOTE — TELEPHONE ENCOUNTER
Order placed for bilateral trochanteric bursa injections.    Tiara Montesinos MD  [facility]

## 2020-12-15 NOTE — TELEPHONE ENCOUNTER
Routing to provider to review. Would like repeat bilateral GT Bursa injections. Left done 9/15, right done 10/27. Can these be done together or how much spacing between is recommended?    ----------------Mychart Below from pt----------------  Can I get shots in both of my hips this week?  My left hip is hurting again, has been for a couple of weeks.  My right hip is starting to be sore and I know it will be painful soon because the dull pain is starting in my lower back again.        --------------Mychart below response to pt----------------    Trice Cerrato,      I can see that we did your left hip on 9/15 and your right on 10/27.  We generally do space injections out to every 3 months because of the steroid used. Sounds like your left is more painful right now but the right is not too far behind. To clarify, there isn't any new or different kind of pain? I will discuss with Dr Montesinos and let her know you would like repeat injections.     Arpita RAMOS, RN Care Coordinator  [facility]  Pain Management

## 2020-12-16 NOTE — PROGRESS NOTES
[facility] - Procedure Note    Date of Service: 12/17/2020  Procedure performed: BILATERAL Greater Trochanteric Bursa Injection  Diagnosis: Bilateral Trochanteric Bursitis  : Lulu Meza MD   Anesthesia: none      Indications:   Kobe Hanson Jr. is a 45 year old male was sent by Dr. LAGUNA for bilateral trochanteric bursa injections.  They have a history of bilateral hip pain worse when lying on his side.  Exam shows TTP over the bilateral greater trochanter and they have tried conservative treatment including previous injections, medications and PT.    This is a repeat injection.  Previous left bursa injection done by Dr. Laguna on 9/15/2020 & right bursa injection done by Dr. Laguna on 10/27/2020 provided good pain relief for a period of 2-4 months.     LUMBAR MRI was done on 8/26/2020 which showed                                                     IMPRESSION:    Degenerative disc disease at L3-L4, L4-L5, and L5-S1. Central disc  protrusion at L4-L5 with annular fissure. More subtle disc  abnormalities at L3-4 and L5-S1.    Options/alternatives, benefits and risks were discussed with the patient including bleeding infection and steroid risks. Questions were answered to his satisfaction and he agrees to proceed. Voluntary informed consent was obtained and signed.     Vitals were reviewed: Yes  Allergies were reviewed:  Yes   Medications were reviewed:  Yes       Procedure:  After getting informed consent, patient was brought into the procedure suite and was placed in a prone position on the procedure table.   A Pause for the Cause was performed.  Patient was prepped and draped in sterile fashion.     The area over the right greater trochanter was palpated, and the tender area was identified, corresponding to the area of the trochanteric bursa.  The area was cleaned with Chloroprep.  A 22-gauge, 1.5-inch needle was inserted, aiming towards the trochanter.  When bone  was encountered, the needle was slightly drawn.  A solution containing local anesthesic and steroid was injected.  The needle was removed.  Hemostasis was achieved. Bandaids were placed as appropriate.    The procedure was repeated on the left side.    In total, 8 ml of 0.5% bupivacaine and 2 ml (80 mg) of kenalog was injected.    Hemostasis was achieved, the area was cleaned, and bandaids were placed when appropriate.  The patient tolerated the procedure well, and was taken to the recovery room.    Images were saved to PACS.    Follow-up with the referring provider    12/24/2020 medical marijuana certification Scarlett Dupont, CNP @ [id number]      ANNEMARIE PAULA MD   Pain Management & Addiction Medicine

## 2020-12-17 ENCOUNTER — OFFICE VISIT (OUTPATIENT)
Dept: PALLIATIVE MEDICINE | Facility: CLINIC | Age: 45
End: 2020-12-17
Attending: PHYSICAL MEDICINE & REHABILITATION
Payer: COMMERCIAL

## 2020-12-17 VITALS — OXYGEN SATURATION: 100 % | HEART RATE: 79 BPM | DIASTOLIC BLOOD PRESSURE: 94 MMHG | SYSTOLIC BLOOD PRESSURE: 137 MMHG

## 2020-12-17 DIAGNOSIS — M70.62 TROCHANTERIC BURSITIS OF BOTH HIPS: Primary | ICD-10-CM

## 2020-12-17 DIAGNOSIS — M70.61 TROCHANTERIC BURSITIS OF BOTH HIPS: Primary | ICD-10-CM

## 2020-12-17 PROCEDURE — 20610 DRAIN/INJ JOINT/BURSA W/O US: CPT | Mod: 50 | Performed by: ANESTHESIOLOGY

## 2020-12-17 RX ORDER — TRIAMCINOLONE ACETONIDE 40 MG/ML
40 INJECTION, SUSPENSION INTRA-ARTICULAR; INTRAMUSCULAR ONCE
Status: COMPLETED | OUTPATIENT
Start: 2020-12-17 | End: 2020-12-17

## 2020-12-17 RX ORDER — BUPIVACAINE HYDROCHLORIDE 5 MG/ML
10 INJECTION, SOLUTION EPIDURAL; INTRACAUDAL ONCE
Status: COMPLETED | OUTPATIENT
Start: 2020-12-17 | End: 2020-12-17

## 2020-12-17 RX ADMIN — TRIAMCINOLONE ACETONIDE 40 MG: 40 INJECTION, SUSPENSION INTRA-ARTICULAR; INTRAMUSCULAR at 10:07

## 2020-12-17 RX ADMIN — BUPIVACAINE HYDROCHLORIDE 50 MG: 5 INJECTION, SOLUTION EPIDURAL; INTRACAUDAL at 10:06

## 2020-12-17 RX ADMIN — TRIAMCINOLONE ACETONIDE 40 MG: 40 INJECTION, SUSPENSION INTRA-ARTICULAR; INTRAMUSCULAR at 10:06

## 2020-12-17 ASSESSMENT — PAIN SCALES - GENERAL: PAINLEVEL: SEVERE PAIN (6)

## 2020-12-17 NOTE — PATIENT INSTRUCTIONS
[facility]   Post Procedure Instructions    Today you had:  Bilateral trochanteric bursa injection      Medications used:  lidocaine   bupivicaine   kenolog   dexamethasone          Go to the emergency room if you develop any shortness of breath    Monitor the injection sites for signs and symptoms of infection-fever, chills, redness, swelling, warmth, or drainage to areas.    You may have soreness at injection sites for up to 24 hours.    You may apply ice to the painful areas to help minimize the discomfort of the needle pokes.    Do not apply heat to sites for at least 12 hours.    You may use anti-inflammatory medications or Tylenol for pain control if necessary    Pain Clinic phone number during work hours Monday-Friday:  [phone]    After hours provider line: [phone]

## 2020-12-20 ENCOUNTER — HEALTH MAINTENANCE LETTER (OUTPATIENT)
Age: 45
End: 2020-12-20

## 2020-12-22 NOTE — TELEPHONE ENCOUNTER
Procedure was completed 12/17. Closing encounter.    Lucila SHIRLEY    [facility]

## 2020-12-22 NOTE — TELEPHONE ENCOUNTER
Dr Montesinos placed the order for Trochanteric bursitis of both hips last week.  Can you please call patient to set up the injections.    Routing to scheduling    Claudette Henson RN  Care Coordinator  [facility]

## 2021-01-04 ENCOUNTER — E-VISIT (OUTPATIENT)
Dept: INTERNAL MEDICINE | Facility: CLINIC | Age: 46
End: 2021-01-04
Payer: COMMERCIAL

## 2021-01-04 ENCOUNTER — TELEPHONE (OUTPATIENT)
Dept: PALLIATIVE MEDICINE | Facility: CLINIC | Age: 46
End: 2021-01-04

## 2021-01-04 ENCOUNTER — OFFICE VISIT (OUTPATIENT)
Dept: URGENT CARE | Facility: URGENT CARE | Age: 46
End: 2021-01-04
Payer: COMMERCIAL

## 2021-01-04 VITALS
HEART RATE: 79 BPM | OXYGEN SATURATION: 98 % | SYSTOLIC BLOOD PRESSURE: 134 MMHG | HEIGHT: 71 IN | DIASTOLIC BLOOD PRESSURE: 85 MMHG | RESPIRATION RATE: 18 BRPM | TEMPERATURE: 97.9 F | WEIGHT: 200 LBS | BODY MASS INDEX: 28 KG/M2

## 2021-01-04 DIAGNOSIS — M54.50 ACUTE BILATERAL LOW BACK PAIN WITHOUT SCIATICA: Primary | ICD-10-CM

## 2021-01-04 DIAGNOSIS — M54.50 ACUTE LOW BACK PAIN, UNSPECIFIED BACK PAIN LATERALITY, UNSPECIFIED WHETHER SCIATICA PRESENT: Primary | ICD-10-CM

## 2021-01-04 PROCEDURE — 99213 OFFICE O/P EST LOW 20 MIN: CPT | Performed by: FAMILY MEDICINE

## 2021-01-04 PROCEDURE — 99207 PR NON-BILLABLE SERV PER CHARTING: CPT | Performed by: INTERNAL MEDICINE

## 2021-01-04 RX ORDER — METHYLPREDNISOLONE 4 MG
TABLET, DOSE PACK ORAL
Qty: 21 TABLET | Refills: 0 | Status: SHIPPED | OUTPATIENT
Start: 2021-01-04 | End: 2021-05-03

## 2021-01-04 RX ORDER — METHOCARBAMOL 750 MG/1
750 TABLET, FILM COATED ORAL 4 TIMES DAILY
Qty: 28 TABLET | Refills: 0 | Status: SHIPPED | OUTPATIENT
Start: 2021-01-04 | End: 2021-01-11

## 2021-01-04 RX ORDER — HYDROCODONE BITARTRATE AND ACETAMINOPHEN 5; 325 MG/1; MG/1
1 TABLET ORAL EVERY 6 HOURS PRN
Qty: 8 TABLET | Refills: 0 | Status: SHIPPED | OUTPATIENT
Start: 2021-01-04 | End: 2021-01-06

## 2021-01-04 ASSESSMENT — MIFFLIN-ST. JEOR: SCORE: 1819.32

## 2021-01-04 NOTE — TELEPHONE ENCOUNTER
This patient is scheduled at 0830 am tomorrow with Scarlett Dupont. He is a patient of Dr Montesinos's.  Can you please call him and cancel that appointment and reschedule it with Dr Montesinos. She is able to do the medical cannabis certification    Routing to scheduling    /Claudette Roberts RN  Care Coordinator  [facility]

## 2021-01-04 NOTE — PROGRESS NOTES
"SUBJECTIVEHPI: Kobe Hanson Jr. is a 45 year old male who presents for evaluation of back pain.  Symptoms began 1 day(s) ago, have been onset acute and are worse.  Pain is located in the low back bilateral region, with radiation to does not radiate.  Recent injury:turning/bending   Personal hx of back pain is previous herniated disc.  Pain is exacerbated by: bending and changing position.  Pain is relieved by: none.  Associated sx include: none.  Red flag symptoms: negative.    No past medical history on file.  Allergies   Allergen Reactions     Dust Mite Extract      Mold      Trees      Social History     Tobacco Use     Smoking status: Former Smoker     Quit date: 2/3/2007     Years since quittin.9     Smokeless tobacco: Never Used   Substance Use Topics     Alcohol use: Yes        Allergies   Allergen Reactions     Dust Mite Extract      Mold      Trees        ROS:CONSTITUTIONAL:NEGATIVE for fever, chills, change in weight    OBJECTIVE:  /85 (BP Location: Left arm, Patient Position: Chair, Cuff Size: Adult Regular)   Pulse 79   Temp 97.9  F (36.6  C) (Temporal)   Resp 18   Ht 1.803 m (5' 11\")   Wt 90.7 kg (200 lb)   SpO2 98%   BMI 27.89 kg/m    Back examination: Back symmetric, no curvature. ROM normal. No CVA tenderness., positive findings: limitation of motion - flexion: Moderate and extension: Moderate, paraspinal muscle spasm, tenderness to palpation.  Straight leg raise test: negative.  GENERAL APPEARANCE: healthy, alert and no distress  NEURO: Normal strength and tone with no weakness or sensory deficit noted, reflexes normal   SKIN: no suspicious lesions or rashes      ICD-10-CM    1. Acute bilateral low back pain without sciatica  M54.5 methocarbamol (ROBAXIN) 750 MG tablet     methylPREDNISolone (MEDROL DOSEPAK) 4 MG tablet therapy pack     HYDROcodone-acetaminophen (NORCO) 5-325 MG tablet     Continue prn heat or ice application.    F/U PCP/IM/FP if persists, ED if worse    "

## 2021-01-05 NOTE — TELEPHONE ENCOUNTER
Chart review: is rescheduled to 1/11/21 with Dr Yamel RICON, RN Care Coordinator  [facility]  [facility]

## 2021-01-11 ENCOUNTER — VIRTUAL VISIT (OUTPATIENT)
Dept: PALLIATIVE MEDICINE | Facility: CLINIC | Age: 46
End: 2021-01-11
Payer: COMMERCIAL

## 2021-01-11 DIAGNOSIS — M47.817 LUMBOSACRAL SPONDYLOSIS WITHOUT MYELOPATHY: ICD-10-CM

## 2021-01-11 DIAGNOSIS — M70.61 TROCHANTERIC BURSITIS OF BOTH HIPS: Primary | ICD-10-CM

## 2021-01-11 DIAGNOSIS — M70.62 TROCHANTERIC BURSITIS OF BOTH HIPS: Primary | ICD-10-CM

## 2021-01-11 PROCEDURE — 99214 OFFICE O/P EST MOD 30 MIN: CPT | Mod: 95 | Performed by: PHYSICAL MEDICINE & REHABILITATION

## 2021-01-11 ASSESSMENT — PAIN SCALES - GENERAL: PAINLEVEL: MODERATE PAIN (4)

## 2021-01-11 NOTE — PROGRESS NOTES
Saqib is a 45 year old who is being evaluated via a billable telephone visit.      What phone number would you like to be contacted at? [phone]  How would you like to obtain your AVS? ALYSSIA Cadena [facility]      JODIE [facility]    Chief Complaint: Back pain    Interval History:  Last seen on 8/19/2020.        Recommendations/plan at the last visit included:  1. Physical Therapy: He should continue with his HEP for now.  2. Diagnostic Studies: No additional imaging needed.   3. Medication Management:    1. Continue Ibuprofen PRN, not exceeding 2,400 mg daily  2. We briefly discussed addition of gabapentin to help with neuropathic pain. He is hesitant at this time to try medication due to concern of SE of grogginess since he has to be very alert to do his job and make quick decisions.   3. Can also consider nortriptyline or Cymbalta.   4. Further procedures recommended: He had 100% pain relief for 2-3 weeks with a left L5-S1 Transforaminal epidural steroid injection. Pain is now back to baseline. He does not want to try another injection at this time due to the short period of pain relief he had from the last one. It would be reasonable to consider repeating a left L5-S1 Transforaminal epidural steroid injection since he did have significant transient improvement in pain vs trying an L4-5 interlaminar ALFREDO.    5. Referral: Discussed Neurosurgical referral to evaluate if there is potentially a surgical option that would be recommended for him. He is becoming more and more frustrated over time with this pain and would like to pursue a more definitive plan if that is an option.   6. Follow up: He can follow up with me after he meets with Neurosurgery.     Since his last visit, Kobe Hanson Jr. reports:  - Since last visit he had bilateral trochanteric bursa injections which help for period of 2-4 months. This has been very helpful for the  "lateral hip pain.  - Back pain now is more centralized. He has an acute flare of back pain a couple weeks ago. Was started on a medrol dose jez. Pain is somewhat improved now.   - He is still working on following an anti-inflammatory diet.   - Doing yoga regularly.   - he is interested in medical cannabis for pain.     Pain Information:   Pain quality: Aching and dull    Pain rating: intensity can get up to 7/10 in severity.     Annual Controlled Substance Agreement: NA  UDS: NA    Current Relevant Pain Medications:  Ibuprofen 600 mg q 4 hrs prn - [facility]    Other Medications:  NA     Previous Medications: (H--helped; HI--Helped initially; [facility]-- somewhat helpful, NH--No help; W--worse; SE--side effects)   Medrol dose jez - H  Norco - H  Cyclobenzaprine - ?  Icy/hot - NH  aspercreme - NH  Tylenol - NH  CBD oil - H but SE of \"feeling high\"    Past Pain Treatments:  Behavioral interventions: None  PT: Yes - has done multiple times. Still doing HEP 5 days per week. Incorporating yoga.   Manual Medicine: yes - was doing prior to COVID - H  Surgeries: None  Acupuncture: None  TENs Unit: Yes - [facility] for transient benefit  Injections:   - Left L5-S1 Transforaminal epidural steroid injection on 6/29/2020 - H for 2-3 weeks, 100 % relief during that time.   - Left trochanteric bursa injection on 9/15/2020 - H  - Right trochanteric bursa injection on 10/27/2020 - H  - Bilateral trochanteric bursa injections on 12/17/2020 by Dr. Meza - H  Other:   - inversion table - H  - gluten free diet - H    [place] Board of Pharmacy Data Base Reviewed:    YES; As expected, no concern for misuse/abuse of controlled medications based on this report.    Medications:  Current Outpatient Medications   Medication Sig Dispense Refill     desonide (DESOWEN) 0.05 % external cream Apply topically 2 times daily To aa on eyelids for 5-7 days. Tapering with improvement. 60 g 0     ibuprofen (ADVIL/MOTRIN) 200 MG capsule Take 200-800 mg by mouth as needed " for fever        methocarbamol (ROBAXIN) 750 MG tablet Take 1 tablet (750 mg) by mouth 4 times daily for 7 days 28 tablet 0     methylPREDNISolone (MEDROL DOSEPAK) 4 MG tablet therapy pack Follow Package Directions 21 tablet 0     triamcinolone (KENALOG) 0.1 % external cream Apply topically 2 times daily To aa on chest for 1-2 weeks during flares. Tapering with improvement. Do not use on face or body folds. 60 g 1     Review of Systems: A 10-point review of systems was negative, with the exception of chronic pain issues.      Social History: Reviewed; unchanged from previous consultation.      Family history: Reviewed; unchanged from previous consultation.     PHYSICAL EXAM:   Constitutional: alert and in no distress   Psychiatric/mental status:  Appropriate affect. Mood normal.     DIAGNOSTIC TESTS:  Imaging Studies:   Lumbar MRI completed on 8/26/2019 showed:  FINDINGS:  Alignment is maintained. Bone marrow is within normal limits.  Multilevel mild disc height loss and mild facet arthropathy are  present as detailed below. The conus medullaris and cauda equina are  unremarkable. Paraspinal soft tissues are unremarkable.     Segmental Analysis:      T12-L1:  No significant spinal canal or foraminal stenosis.      L1-L2:  No significant spinal canal or foraminal stenosis.       L2-L3:  No significant spinal canal or foraminal stenosis.       L3-L4:  Disc bulge. Subtle posterior disc T2 hyperintensity is present  suggestive of mild annular fissure. Mild bilateral facet arthropathy.  Mild bilateral foraminal stenosis. Mild spinal canal stenosis.       L4-L5:  Disc bulge. Central disc protrusion is present with  curvilinear disc T2 hyperintensity consistent with annular fissure.  This probably contacts the traversing left L5 nerve root within the  lateral recess (series 5 image 23). Mild bilateral facet arthropathy.   Mild bilateral foraminal stenosis. Mild spinal canal stenosis.     L5-S1:  Disc bulge. Subtle  "posterior disc T2 hyperintensity is present  suggestive of mild annular fissure. Bulge is in close proximity to the  traversing left S1 nerve root without evidence of displacement (series  5 image 30). Mild bilateral facet arthropathy. Mild bilateral  foraminal stenosis. No significant spinal canal stenosis.                                                                      IMPRESSION:    Degenerative disc disease at L3-L4, L4-L5, and L5-S1. Central disc  protrusion at L4-L5 with annular fissure. More subtle disc  abnormalities at L3-4 and L5-S1.    Assessment:  Kobe Hanson Jr. is a 44 year old male with no significant past medical history presents with complaints of chronic back pain.      1. Chronic low back pain: Onset of pain about 20 years ago without any inciting event. Pain is generally constant now and has periods where his \"back goes out\". Pain is worse with bending activities. Majority of the pain is on the left side. Located in low back and radiates into the buttock and more distally into the posterior aspect of the leg when more severe. Initial exam showed positive SLR on left. MRI shows disc bulges at L3-4, L4-5 and L5-S1 with disc material contacting the left L5 nerve root at L4-5 and the left S1 nerve root at L5-S1. There is mild facet arthropathy at these levels.  Etiology of pain is likely multifactorial secondary to lumbar radiculitis, discogenic pain and potentially some contribution from mild facet arthropathy.     2. Bilateral trochanteric bursitis    Plan:  1. Physical Therapy: He should continue with his HEP for now.  2. Diagnostic Studies: No additional imaging needed.   3. Medication Management:    1. Certification completed for medical cannabis for chronic back pain.   2. Continue Ibuprofen PRN, not exceeding 2,400 mg daily  3. Can consider nortriptyline or Cymbalta in the future  4. Further procedures recommended: May repeat bilateral trochanteric bursa injections PRN.   5. Follow " up: 3 months to assess benefit of medical cannabis and for trochanteric bursa injections PRN.    Phone call duration: 12 minutes    BILLING TIME DOCUMENTATION:   The total TIME spent on this patient on the date of the encounter/appointment was 23 minutes.      TOTAL TIME includes:   Time spent preparing to see the patient (reviewing records and tests)  Time spent face to face (or over the phone) with the patient   Time spent ordering tests, medications, procedures and referrals   Time spent documenting clinical information in Lu Montesinos MD  [facility] [place]

## 2021-01-11 NOTE — PATIENT INSTRUCTIONS
1. Certification completed for medical cannabis. You should receive an e-mail from the [place] Department of Health to complete the registration process. It can take up to 30 days to get the e-mail.   2. Follow up with me in 3 months to review benefit of medical cannabis.   3. You can call if you want to have repeated trochanteric bursa injections.     Take care,  Tiara Montesinos MD  [facility]     ----------------------------------------------------------------  Clinic Number:  [phone]     Call with any questions about your care and for scheduling assistance.     Calls are returned Monday through Friday between 8 AM and 4:30 PM. We usually get back to you within 2 business days depending on the issue/request.    If we are prescribing your medications:    For opioid medication refills, call the clinic or send a [employer] message 7 days in advance.  Please include:    Name of requested medication    Name of the pharmacy.    For non-opioid medications, call your pharmacy directly to request a refill. Please allow 3-4 days to be processed.     Per [place] State Law:    All controlled substance prescriptions must be filled within 30 days of being written.      For those controlled substances allowing refills, pickup must occur within 30 days of last fill.      We believe regular attendance is key to your success in our program!      Any time you are unable to keep your appointment we ask that you call us at least 24 hours in advance to cancel.This will allow us to offer the appointment time to another patient.     Multiple missed appointments may lead to dismissal from the clinic.

## 2021-01-21 ENCOUNTER — PRE VISIT (OUTPATIENT)
Dept: UROLOGY | Facility: CLINIC | Age: 46
End: 2021-01-21

## 2021-02-09 PROBLEM — M54.50 CHRONIC BILATERAL LOW BACK PAIN WITHOUT SCIATICA: Status: RESOLVED | Noted: 2020-09-30 | Resolved: 2021-02-09

## 2021-02-09 PROBLEM — G89.29 CHRONIC BILATERAL LOW BACK PAIN WITHOUT SCIATICA: Status: RESOLVED | Noted: 2020-09-30 | Resolved: 2021-02-09

## 2021-02-09 NOTE — PROGRESS NOTES
Subjective:  HPI  Physical Exam  Oswestry Score: 8.89 %                 Objective:  System    Physical Exam    General     ROS    Assessment/Plan:    DISCHARGE REPORT    Progress reporting period is from 9/30/2020 to 10/13/2020.     SUBJECTIVE  Subjective: Patient reports having joint pains from virus mainly in his hips, ankles and knees. His low back pain is much improved and bought a stability ball and would like to learn some exercises on it.   Current Pain level: 0/10   Initial Pain level: 3/10   Changes in function: Yes, see goal flow sheet for change in function   Adverse reactions: None.    OBJECTIVE    Objective: Progressed with exercises and stability ball exs. Tolerated well.     ASSESSMENT/PLAN    STG/LTGs have been met or progress has been made towards goals:  Yes (See Goal flow sheet completed today.)  Assessment of Progress: The patient's condition is improving.  The patient's condition has potential to improve.  Self Management Plans:  Patient is independent in a home treatment program.  Patient is independent in self management of symptoms.    Kobe continues to require the following intervention to meet STG and LTG's: PT intervention is no longer required to meet STG/LTG.      Recommendations:  This patient is ready to be discharged from therapy and continue their home treatment program.    Please refer to the daily flowsheet for treatment today, total treatment time and time spent performing 1:1 timed codes.

## 2021-04-12 ENCOUNTER — VIRTUAL VISIT (OUTPATIENT)
Dept: PALLIATIVE MEDICINE | Facility: CLINIC | Age: 46
End: 2021-04-12
Payer: COMMERCIAL

## 2021-04-12 DIAGNOSIS — M47.817 LUMBOSACRAL SPONDYLOSIS WITHOUT MYELOPATHY: ICD-10-CM

## 2021-04-12 DIAGNOSIS — M70.61 TROCHANTERIC BURSITIS OF BOTH HIPS: Primary | ICD-10-CM

## 2021-04-12 DIAGNOSIS — M70.62 TROCHANTERIC BURSITIS OF BOTH HIPS: Primary | ICD-10-CM

## 2021-04-12 PROCEDURE — 99213 OFFICE O/P EST LOW 20 MIN: CPT | Mod: 95 | Performed by: PHYSICAL MEDICINE & REHABILITATION

## 2021-04-12 ASSESSMENT — PAIN SCALES - GENERAL: PAINLEVEL: MILD PAIN (2)

## 2021-04-12 NOTE — PATIENT INSTRUCTIONS
It if great you are having such good pain relief with the medical cannabis. You can follow up with me in about 9 months for re-certification if it continues to be helpful.     Tiara Montesinos MD  [facility]     ----------------------------------------------------------------  Clinic Number:  [phone]     Call with any questions about your care and for scheduling assistance.     Calls are returned Monday through Friday between 8 AM and 4:30 PM. We usually get back to you within 2 business days depending on the issue/request.    If we are prescribing your medications:    For opioid medication refills, call the clinic or send a [employer] message 7 days in advance.  Please include:    Name of requested medication    Name of the pharmacy.    For non-opioid medications, call your pharmacy directly to request a refill. Please allow 3-4 days to be processed.     Per [place] State Law:    All controlled substance prescriptions must be filled within 30 days of being written.      For those controlled substances allowing refills, pickup must occur within 30 days of last fill.      We believe regular attendance is key to your success in our program!      Any time you are unable to keep your appointment we ask that you call us at least 24 hours in advance to cancel.This will allow us to offer the appointment time to another patient.     Multiple missed appointments may lead to dismissal from the clinic.

## 2021-04-12 NOTE — PROGRESS NOTES
"Saqib is a 45 year old who is being evaluated via a billable telephone visit.      What phone number would you like to be contacted at? [phone]  How would you like to obtain your AVS? DEMETRIS Yip [facility]     [facility]    Chief Complaint: Back pain    Interval History:  Last seen on 1/11/2021.     Recommendations/plan at the last visit included:  1. Physical Therapy: He should continue with his HEP for now.  2. Diagnostic Studies: No additional imaging needed.   3. Medication Management:    1. Certification completed for medical cannabis for chronic back pain.   2. Continue Ibuprofen PRN, not exceeding 2,400 mg daily  3. Can consider nortriptyline or Cymbalta in the future  4. Further procedures recommended: May repeat bilateral trochanteric bursa injections PRN.   5. Follow up: 3 months to assess benefit of medical cannabis and for trochanteric bursa injections PRN.    Since his last visit, Kobe Hanson Jr. reports:  - Medical cannabis certification completed at the last visit. He is having very good pain relief with this.   - He is using at 7:30 in the morning and this lasts until about 5 in the evening. He is sleeping all night. He is using oil during the day and a spray mist under the tongue at night.   - it has been \"lifechanging\". Mobility is much better. Pain levels 2-3/10.   - still exercising daily.     Annual Controlled Substance Agreement: NA  UDS: NA    Current Relevant Pain Medications:  Ibuprofen 600 mg q 4 hrs prn - [facility]  Medical cannabis - very helpful    Other Medications:  NA     Previous Medications: (H--helped; HI--Helped initially; [facility]-- somewhat helpful, NH--No help; W--worse; SE--side effects)   Medrol dose jez - H  Norco - H  Cyclobenzaprine - ?  Icy/hot - NH  aspercreme - NH  Tylenol - NH  CBD oil - H but SE of \"feeling high\"    Past Pain Treatments:  Behavioral interventions: None  PT: Yes - has " done multiple times. Still doing HEP 5 days per week. Incorporating yoga.   Manual Medicine: yes - was doing prior to COVID - H  Surgeries: None  Acupuncture: None  TENs Unit: Yes - [facility] for transient benefit  Injections:   - Left L5-S1 Transforaminal epidural steroid injection on 6/29/2020 - H for 2-3 weeks, 100 % relief during that time.   - Left trochanteric bursa injection on 9/15/2020 - H  - Right trochanteric bursa injection on 10/27/2020 - H  - Bilateral trochanteric bursa injections on 12/17/2020 by Dr. Meza - [facility]  Other:   - inversion table - H  - gluten free diet - H    [place] Board of Pharmacy Data Base Reviewed:    YES; As expected, no concern for misuse/abuse of controlled medications based on this report.    Medications:  Current Outpatient Medications   Medication Sig Dispense Refill     desonide (DESOWEN) 0.05 % external cream Apply topically 2 times daily To aa on eyelids for 5-7 days. Tapering with improvement. 60 g 0     ibuprofen (ADVIL/MOTRIN) 200 MG capsule Take 200-800 mg by mouth as needed for fever        methylPREDNISolone (MEDROL DOSEPAK) 4 MG tablet therapy pack Follow Package Directions (Patient not taking: Reported on 1/11/2021) 21 tablet 0     triamcinolone (KENALOG) 0.1 % external cream Apply topically 2 times daily To aa on chest for 1-2 weeks during flares. Tapering with improvement. Do not use on face or body folds. 60 g 1     PHYSICAL EXAM:   Constitutional: alert and in no distress   Psychiatric/mental status:  Appropriate affect. Mood normal.     DIAGNOSTIC TESTS:  Imaging Studies:   Lumbar MRI completed on 8/26/2019 showed:  FINDINGS:  Alignment is maintained. Bone marrow is within normal limits.  Multilevel mild disc height loss and mild facet arthropathy are  present as detailed below. The conus medullaris and cauda equina are  unremarkable. Paraspinal soft tissues are unremarkable.     Segmental Analysis:      T12-L1:  No significant spinal canal or foraminal stenosis.       L1-L2:  No significant spinal canal or foraminal stenosis.       L2-L3:  No significant spinal canal or foraminal stenosis.       L3-L4:  Disc bulge. Subtle posterior disc T2 hyperintensity is present  suggestive of mild annular fissure. Mild bilateral facet arthropathy.  Mild bilateral foraminal stenosis. Mild spinal canal stenosis.       L4-L5:  Disc bulge. Central disc protrusion is present with  curvilinear disc T2 hyperintensity consistent with annular fissure.  This probably contacts the traversing left L5 nerve root within the  lateral recess (series 5 image 23). Mild bilateral facet arthropathy.   Mild bilateral foraminal stenosis. Mild spinal canal stenosis.     L5-S1:  Disc bulge. Subtle posterior disc T2 hyperintensity is present  suggestive of mild annular fissure. Bulge is in close proximity to the  traversing left S1 nerve root without evidence of displacement (series  5 image 30). Mild bilateral facet arthropathy. Mild bilateral  foraminal stenosis. No significant spinal canal stenosis.                                                                      IMPRESSION:    Degenerative disc disease at L3-L4, L4-L5, and L5-S1. Central disc  protrusion at L4-L5 with annular fissure. More subtle disc  abnormalities at L3-4 and L5-S1.    Assessment:  Kobe Hanson Jr. is a 44 year old male with no significant past medical history presents with complaints of chronic back pain.      1. Chronic low back pain: Onset of pain about 20 years ago without any inciting event.  Pain is worse with bending activities. Majority of the pain is on the left side. Located in low back and radiates into the buttock and more distally into the posterior aspect of the leg when more severe. Initial exam showed positive SLR on left. MRI shows disc bulges at L3-4, L4-5 and L5-S1 with disc material contacting the left L5 nerve root at L4-5 and the left S1 nerve root at L5-S1. There is mild facet arthropathy at these levels.   Etiology of pain is likely multifactorial secondary to lumbar radiculitis, discogenic pain and potentially some contribution from mild facet arthropathy.     2. Bilateral trochanteric bursitis    Plan:  1. Physical Therapy: He should continue with his HEP.   2. Diagnostic Studies: No additional imaging needed.   3. Medication Management:    1. Continue medical cannabis. He is getting excellent pain benefit from this.   2. Continue Ibuprofen PRN, not exceeding 2,400 mg daily  3. Can consider nortriptyline or Cymbalta in the future if needed.   4. Further procedures recommended: May repeat bilateral trochanteric bursa injections PRN.   5. Follow up: in 9 months for re-certification.     Tiara Montesinos MD  [facility]    Phone call duration: 4 minutes    BILLING TIME DOCUMENTATION:   The total TIME spent on this patient on the date of the encounter/appointment was 10 minutes.      TOTAL TIME includes:   Time spent preparing to see the patient (reviewing records and tests)   Time spent face to face (or over the phone) with the patient  Time spent documenting clinical information in Epic

## 2021-04-24 ENCOUNTER — HEALTH MAINTENANCE LETTER (OUTPATIENT)
Age: 46
End: 2021-04-24

## 2021-05-03 ENCOUNTER — OFFICE VISIT (OUTPATIENT)
Dept: INTERNAL MEDICINE | Facility: CLINIC | Age: 46
End: 2021-05-03
Payer: COMMERCIAL

## 2021-05-03 VITALS
BODY MASS INDEX: 28.84 KG/M2 | TEMPERATURE: 97.3 F | HEART RATE: 100 BPM | RESPIRATION RATE: 16 BRPM | WEIGHT: 206 LBS | DIASTOLIC BLOOD PRESSURE: 80 MMHG | HEIGHT: 71 IN | OXYGEN SATURATION: 92 % | SYSTOLIC BLOOD PRESSURE: 120 MMHG

## 2021-05-03 DIAGNOSIS — R14.2 FLATULENCE, ERUCTATION AND GAS PAIN: Primary | ICD-10-CM

## 2021-05-03 DIAGNOSIS — R14.3 FLATULENCE, ERUCTATION AND GAS PAIN: Primary | ICD-10-CM

## 2021-05-03 DIAGNOSIS — R14.1 FLATULENCE, ERUCTATION AND GAS PAIN: Primary | ICD-10-CM

## 2021-05-03 PROCEDURE — 99213 OFFICE O/P EST LOW 20 MIN: CPT | Performed by: INTERNAL MEDICINE

## 2021-05-03 ASSESSMENT — MIFFLIN-ST. JEOR: SCORE: 1846.54

## 2021-05-03 NOTE — PROGRESS NOTES
"    Assessment & Plan     Flatulence, eructation and gas pain  Nonspecific abdominal flatulence and bloating.  Etiology unclear.  Patient is seems to be better now status post enema.  I have suggested observation.  And if symptoms worsen GI assessment for determination of need for colonoscopy or potential upper GI evaluation.  Patient has no systemic symptoms and recently as of September 2020 underwent an extensive metabolic evaluation for diffuse arthralgia which is now since resolved.  We discussed the use of some preemptive Phazyme or Beano prior to meals.  - GASTROENTEROLOGY ADULT REF CONSULT ONLY; Future      Advised patient that he really should be getting an updated physical exam and he will schedule this accordingly.     BMI:   Estimated body mass index is 28.73 kg/m  as calculated from the following:    Height as of this encounter: 1.803 m (5' 11\").    Weight as of this encounter: 93.4 kg (206 lb).       See Patient Instructions    Return in about 1 month (around 6/3/2021) for if symptoms recur or worsen, with Gastroenterology for consultation.    Santos Jimenez MD  [facility]    Ilsa Cerrato is a 45 year old who presents for the following health issues     HPI     Chief Complaint   Patient presents with     Swelling     Patient c/o swelling in throat after eating, in March     Gassy and smelly stools for several months.  Patient reports some very nonspecific bloatedness and occasional loose stools.  Of note he did start CBD oil for his back in January and is unclear if this may be a component.  He apparently was certified for medical cannabis use due to chronic back issues and hip issues for which she was receiving steroid injections.    He has not had any weight loss, blood in the stools.  He states that he did give himself an enema this weekend and then drove down to [place] to be with some friends and ate a lot of bad food but now really does not feel like his " "symptoms are bothersome.    Review of Systems   CONSTITUTIONAL: NEGATIVE for fever, chills, change in weight  EYES: NEGATIVE for vision changes or irritation  RESP: NEGATIVE for significant cough or SOB  CV: NEGATIVE for chest pain, palpitations or peripheral edema  : NEGATIVE for frequency, dysuria, or hematuria  MUSCULOSKELETAL: NEGATIVE for significant arthralgias or myalgia  NEURO: NEGATIVE for weakness, dizziness or paresthesias  HEME: NEGATIVE for bleeding problems  PSYCHIATRIC: NEGATIVE for changes in mood or affect      Objective    /80   Pulse 100   Temp 97.3  F (36.3  C) (Temporal)   Resp 16   Ht 1.803 m (5' 11\")   Wt 93.4 kg (206 lb)   SpO2 92%   BMI 28.73 kg/m    Body mass index is 28.73 kg/m .     Physical Exam   GENERAL: healthy, alert and no distress  EYES: Eyes grossly normal to inspection, PERRL and conjunctivae and sclerae normal  RESP: lungs clear to auscultation - no rales, rhonchi or wheezes  CV: regular rate and rhythm, normal S1 S2, no S3 or S4, no click or rub  MS: no gross musculoskeletal defects noted  NEURO: No focal changes  PSYCH: mentation appears normal, affect normal/bright                  "

## 2021-07-15 ENCOUNTER — OFFICE VISIT (OUTPATIENT)
Dept: DERMATOLOGY | Facility: CLINIC | Age: 46
End: 2021-07-15
Payer: COMMERCIAL

## 2021-07-15 VITALS — SYSTOLIC BLOOD PRESSURE: 151 MMHG | DIASTOLIC BLOOD PRESSURE: 103 MMHG | HEART RATE: 81 BPM | OXYGEN SATURATION: 98 %

## 2021-07-15 DIAGNOSIS — L72.0 MILIA: Primary | ICD-10-CM

## 2021-07-15 PROCEDURE — 99202 OFFICE O/P NEW SF 15 MIN: CPT | Mod: 25 | Performed by: DERMATOLOGY

## 2021-07-15 PROCEDURE — 11310 SHAVE SKIN LESION 0.5 CM/<: CPT | Performed by: DERMATOLOGY

## 2021-07-15 NOTE — PATIENT INSTRUCTIONS
Sutured Wound Care     [facility]: [phone]          ? No strenuous activity for 48 hours. Resume moderate activity in 48 hours. No heavy exercising until you are seen for follow up in one week.     ? Take Tylenol as needed for discomfort.                         ? Do not drink alcoholic beverages for 48 hours.     ? Keep the pressure bandage in place for 24 hours. If the bandage becomes blood tinged or loose, reinforce it with gauze and tape.        (Refer to the reverse side of this page for management of bleeding).    ? Remove pressure bandage in 24 hours     ? Leave the flat bandage in place until your follow up appointment.    ? Keep the bandage dry. Wash around it carefully.    ? If the tape becomes soiled or starts to come off, reinforce it with additional paper tape.    ? Do not smoke for 3 weeks; smoking is detrimental to wound healing.    ? It is normal to have swelling and bruising around the surgical site. The bruising will fade in approximately 10-14 days. Elevate the area to reduce swelling.    ? Numbness, itchiness and sensitivity to temperature changes can occur after surgery and may take up to 18 months to normalize.      POSSIBLE COMPLICATIONS    BLEEDIN. Leave the bandage in place.  2. Use tightly rolled up gauze or a cloth to apply direct pressure over the bandage for 20   minutes.  3. Reapply pressure for an additional 20 minutes if necessary  4. Call the office or go to the nearest emergency room if pressure fails to stop the bleeding.  5. Use additional gauze and tape to maintain pressure once the bleeding has stopped.        PAIN:    1. Post operative pain should slowly get better, never worse.  2. A severe increase in pain may indicate a problem. Call the office if this occurs.    In case of emergency phone:Dr Cordero [phone]

## 2021-07-15 NOTE — LETTER
7/15/2021         RE: Kobe Hanson Jr.  [id number]  [place] [address]        Dear Colleague,    Thank you for referring your patient, Kobe Hanson Jr., to the [facility]. Please see a copy of my visit note below.    Kobe Hanson Jr. is an extremely pleasant 45 year old year old male patient here today for white spot on right lower lip.   .   Patient states this has been present for a while.  Patient reports the following symptoms:  goriwng.  Patient reports the following previous treatments none.  These treatments did not work.  Patient reports the following modifying factors none.  Associated symptoms: none.  Patient has no other skin complaints today.  Remainder of the HPI, Meds, PMH, Allergies, FH, and SH was reviewed in chart.    History reviewed. No pertinent past medical history.    Past Surgical History:   Procedure Laterality Date     VASECTOMY  2010        Family History   Problem Relation Age of Onset     Hypertension Father      Melanoma Maternal Grandfather        Social History     Socioeconomic History     Marital status:      Spouse name: Not on file     Number of children: Not on file     Years of education: Not on file     Highest education level: Not on file   Occupational History     Employer: [employer]   Tobacco Use     Smoking status: Former Smoker     Quit date: 2/3/2007     Years since quittin.4     Smokeless tobacco: Never Used   Substance and Sexual Activity     Alcohol use: Yes     Drug use: No     Sexual activity: Yes     Partners: Female     Birth control/protection: Surgical   Other Topics Concern     Parent/sibling w/ CABG, MI or angioplasty before 65F 55M? Not Asked   Social History Narrative     Not on file     Social Determinants of Health     Financial Resource Strain:      Difficulty of Paying Living Expenses:    Food Insecurity:      Worried About Running Out of Food in the Last Year:      Ran Out of  Food in the Last Year:    Transportation Needs:      Lack of Transportation (Medical):      Lack of Transportation (Non-Medical):    Physical Activity:      Days of Exercise per Week:      Minutes of Exercise per Session:    Stress:      Feeling of Stress :    Social Connections:      Frequency of Communication with Friends and Family:      Frequency of Social Gatherings with Friends and Family:      Attends Taoism Services:      Active Member of Clubs or Organizations:      Attends Club or Organization Meetings:      Marital Status:    Intimate Partner Violence:      Fear of Current or Ex-Partner:      Emotionally Abused:      Physically Abused:      Sexually Abused:        Outpatient Encounter Medications as of 7/15/2021   Medication Sig Dispense Refill     desonide (DESOWEN) 0.05 % external cream Apply topically 2 times daily To aa on eyelids for 5-7 days. Tapering with improvement. 60 g 0     ibuprofen (ADVIL/MOTRIN) 200 MG capsule Take 200-800 mg by mouth as needed for fever        medical cannabis (Patient's own supply) See Admin Instructions (The purpose of this order is to document that the patient reports taking medical cannabis.  This is not a prescription, and is not used to certify that the patient has a qualifying medical condition.)       triamcinolone (KENALOG) 0.1 % external cream Apply topically 2 times daily To aa on chest for 1-2 weeks during flares. Tapering with improvement. Do not use on face or body folds. 60 g 1     No facility-administered encounter medications on file as of 7/15/2021.             O:   NAD, WDWN, Alert & Oriented, Mood & Affect wnl, Vitals stable   Here today alone   BP (!) 151/103   Pulse 81   SpO2 98%    General appearance normal   Vitals stable   Alert, oriented and in no acute distress     R lower lip white 2mm papule      Eyes: Conjunctivae/lids:Normal     ENT: Lips, buccal mucosa, tongue: normal    MSK:Normal    Cardiovascular: peripheral edema none    Pulm:  Breathing Normal    Neuro/Psych: Orientation:Alert and Orientedx3 ; Mood/Affect:normal       A/P:  1. Milia  Pathophysiology discussed with pt  TANGENTIAL EXCISION:  After consent, anesthesia with LEC and prep, tangential excision performed.  No complications and routine wound care.    It was a pleasure speaking to Kobe Hanson Jr. today.  Bill to follow up with Primary Care provider regarding elevated blood pressure.  Previous clinic notes and pertinent laboratory tests were reviewed prior to Kobe Hanson Jr.'s visit.    Nature of benign skin lesions dicussed with patient.        Again, thank you for allowing me to participate in the care of your patient.        Sincerely,        Juan Cordero MD

## 2021-07-15 NOTE — PROGRESS NOTES
Kobe Hanson Jr. is an extremely pleasant 45 year old year old male patient here today for white spot on right lower lip.   .   Patient states this has been present for a while.  Patient reports the following symptoms:  goriwng.  Patient reports the following previous treatments none.  These treatments did not work.  Patient reports the following modifying factors none.  Associated symptoms: none.  Patient has no other skin complaints today.  Remainder of the HPI, Meds, PMH, Allergies, FH, and SH was reviewed in chart.    History reviewed. No pertinent past medical history.    Past Surgical History:   Procedure Laterality Date     VASECTOMY  2010        Family History   Problem Relation Age of Onset     Hypertension Father      Melanoma Maternal Grandfather        Social History     Socioeconomic History     Marital status:      Spouse name: Not on file     Number of children: Not on file     Years of education: Not on file     Highest education level: Not on file   Occupational History     Employer: [employer]   Tobacco Use     Smoking status: Former Smoker     Quit date: 2/3/2007     Years since quittin.4     Smokeless tobacco: Never Used   Substance and Sexual Activity     Alcohol use: Yes     Drug use: No     Sexual activity: Yes     Partners: Female     Birth control/protection: Surgical   Other Topics Concern     Parent/sibling w/ CABG, MI or angioplasty before 65F 55M? Not Asked   Social History Narrative     Not on file     Social Determinants of Health     Financial Resource Strain:      Difficulty of Paying Living Expenses:    Food Insecurity:      Worried About Running Out of Food in the Last Year:      Ran Out of Food in the Last Year:    Transportation Needs:      Lack of Transportation (Medical):      Lack of Transportation (Non-Medical):    Physical Activity:      Days of Exercise per Week:      Minutes of Exercise per Session:    Stress:      Feeling of Stress :    Social  Connections:      Frequency of Communication with Friends and Family:      Frequency of Social Gatherings with Friends and Family:      Attends Congregational Services:      Active Member of Clubs or Organizations:      Attends Club or Organization Meetings:      Marital Status:    Intimate Partner Violence:      Fear of Current or Ex-Partner:      Emotionally Abused:      Physically Abused:      Sexually Abused:        Outpatient Encounter Medications as of 7/15/2021   Medication Sig Dispense Refill     desonide (DESOWEN) 0.05 % external cream Apply topically 2 times daily To aa on eyelids for 5-7 days. Tapering with improvement. 60 g 0     ibuprofen (ADVIL/MOTRIN) 200 MG capsule Take 200-800 mg by mouth as needed for fever        medical cannabis (Patient's own supply) See Admin Instructions (The purpose of this order is to document that the patient reports taking medical cannabis.  This is not a prescription, and is not used to certify that the patient has a qualifying medical condition.)       triamcinolone (KENALOG) 0.1 % external cream Apply topically 2 times daily To aa on chest for 1-2 weeks during flares. Tapering with improvement. Do not use on face or body folds. 60 g 1     No facility-administered encounter medications on file as of 7/15/2021.             O:   NAD, WDWN, Alert & Oriented, Mood & Affect wnl, Vitals stable   Here today alone   BP (!) 151/103   Pulse 81   SpO2 98%    General appearance normal   Vitals stable   Alert, oriented and in no acute distress     R lower lip white 2mm papule      Eyes: Conjunctivae/lids:Normal     ENT: Lips, buccal mucosa, tongue: normal    MSK:Normal    Cardiovascular: peripheral edema none    Pulm: Breathing Normal    Neuro/Psych: Orientation:Alert and Orientedx3 ; Mood/Affect:normal       A/P:  1. Milia  Pathophysiology discussed with pt  TANGENTIAL EXCISION:  After consent, anesthesia with LEC and prep, tangential excision performed.  No complications and routine  wound care.    It was a pleasure speaking to Kobe Hanson Jr. today.  Bill to follow up with Primary Care provider regarding elevated blood pressure.  Previous clinic notes and pertinent laboratory tests were reviewed prior to Kobe Hanson Jr.'s visit.    Nature of benign skin lesions dicussed with patient.

## 2021-08-05 ENCOUNTER — OFFICE VISIT (OUTPATIENT)
Dept: URGENT CARE | Facility: URGENT CARE | Age: 46
End: 2021-08-05
Payer: COMMERCIAL

## 2021-08-05 VITALS — TEMPERATURE: 97.1 F | SYSTOLIC BLOOD PRESSURE: 136 MMHG | HEART RATE: 77 BPM | DIASTOLIC BLOOD PRESSURE: 94 MMHG

## 2021-08-05 DIAGNOSIS — M54.50 ACUTE RIGHT-SIDED LOW BACK PAIN WITHOUT SCIATICA: Primary | ICD-10-CM

## 2021-08-05 PROCEDURE — 99214 OFFICE O/P EST MOD 30 MIN: CPT | Performed by: NURSE PRACTITIONER

## 2021-08-05 RX ORDER — CYCLOBENZAPRINE HCL 10 MG
5-10 TABLET ORAL 3 TIMES DAILY PRN
Qty: 30 TABLET | Refills: 0 | Status: SHIPPED | OUTPATIENT
Start: 2021-08-05 | End: 2021-08-15

## 2021-08-05 RX ORDER — METHYLPREDNISOLONE 4 MG
TABLET, DOSE PACK ORAL
Qty: 21 TABLET | Refills: 0 | Status: SHIPPED | OUTPATIENT
Start: 2021-08-05

## 2021-08-05 NOTE — PATIENT INSTRUCTIONS
Acetaminophen (Tylenol) may be taken up to 4000mg daily (3000mg if over 65) which would be 2 regular strength tablets (325mg) or two extra strength tablets(500mg) up to 4 times a day (3 times a day if over 65).   Check for acetaminophen in other OTC or prescription medications and be sure you add this in the maximum amount you take every day.    Too much acetaminophen can lead to serious liver damage. DO NOT TAKE if you have a history of liver disease.    Ibuprofen 200mg tablets may be taken up to 4 pills 4 times a day(three times a day if over 65)  to the maximum of 3200mg,  (2400mg if >65)  daily as needed for pain.   Take with food.  Don't take with aspirin, Aleve or other antiinflammatory medication or with warfarin. DO NOT TAKE if you have a history of kidney disease.    Patient Education     Relieving Back Pain  Back pain is a common problem. You can strain back muscles by lifting too much weight or just by moving the wrong way. Back strain can be uncomfortable, even painful. And it can take weeks or months to improve. To help yourself feel better and prevent future back strains, try these tips.  Important: Don't give aspirin to children or teens without first discussing it with your child's healthcare provider.  Ice    Ice reduces muscle pain and swelling. It helps most during the first 24 to 48 hours after an injury.    Wrap an ice pack or a bag of frozen peas in a thin towel. Never put ice directly on your skin.    Place the ice where your back hurts the most.    Don t ice for more than 20 minutes at a time.    You can use ice several times a day.  Medicines  Over-the-counter pain relievers include acetaminophen and anti-inflammatory medicines, which includes aspirin, naproxen, or ibuprofen. They can help ease discomfort. Some also reduce swelling.    Tell your healthcare provider about any medicines you are already taking.    Take medicines only as directed.  Manipulation and massage  Having manipulation  by an osteopathic doctor or chiropractor may be helpful. Getting a massage also may help.   Heat  After the first 48 hours, heat can relax sore muscles and improve blood flow.    Try a warm bath or shower. Or use a heating pad set on low. To prevent a burn, keep a cloth between you and the heating pad.    Don t use a heating pad for more than 15 minutes at a time. Never sleep on a heating pad.  [employer] last reviewed this educational content on 6/1/2018 2000-2021 [employer]. All rights reserved. This information is not intended as a substitute for professional medical care. Always follow your healthcare professional's instructions.

## 2021-08-05 NOTE — PROGRESS NOTES
Chief Complaint   Patient presents with     Urgent Care     lower back pain that started yesterday - reports hx of back issues.          ICD-10-CM    1. Acute right-sided low back pain without sciatica  M54.5 methylPREDNISolone (MEDROL DOSEPAK) 4 MG tablet therapy pack     cyclobenzaprine (FLEXERIL) 10 MG tablet   Patient will use ice and/or heat, Tylenol and ibuprofen, and prescription medications to treat symptoms.  He does use medical cannabis and could also use this for his back discomfort.  He request narcotic pain medications but these are declined encouraging him to instead use the maximum amounts of Tylenol and ibuprofen along with the muscle relaxants and steroids.    Subjective     Kobe Hanson Jr. is an 45 year old male who presents to clinic today for right low back pain that started this morning when he bent over the .  He reports having these episodes a couple of times a year and usually is given prescriptions to help and things feel better in 2 to 3 days.    ROS: 10 point ROS neg other than the symptoms noted above in the HPI.       Objective    BP (!) 136/94   Pulse 77   Temp 97.1  F (36.2  C) (Tympanic)     Physical Exam       GENERAL APPEARANCE: alert and moderate distress     MS: extremities normal- no gross deformities noted; normal muscle tone, patient has tenderness over the right low back muscular sure into the piriformis, no pain over the actual spinal processes.     SKIN: no suspicious lesions or rashes     NEURO: Normal strength and tone, mentation intact and speech normal     PSYCH: normal thought process; no significant mood disturbance    Patient Instructions   Acetaminophen (Tylenol) may be taken up to 4000mg daily (3000mg if over 65) which would be 2 regular strength tablets (325mg) or two extra strength tablets(500mg) up to 4 times a day (3 times a day if over 65).   Check for acetaminophen in other OTC or prescription medications and be sure you add this in the  maximum amount you take every day.    Too much acetaminophen can lead to serious liver damage. DO NOT TAKE if you have a history of liver disease.    Ibuprofen 200mg tablets may be taken up to 4 pills 4 times a day(three times a day if over 65)  to the maximum of 3200mg,  (2400mg if >65)  daily as needed for pain.   Take with food.  Don't take with aspirin, Aleve or other antiinflammatory medication or with warfarin. DO NOT TAKE if you have a history of kidney disease.    Patient Education     Relieving Back Pain  Back pain is a common problem. You can strain back muscles by lifting too much weight or just by moving the wrong way. Back strain can be uncomfortable, even painful. And it can take weeks or months to improve. To help yourself feel better and prevent future back strains, try these tips.  Important: Don't give aspirin to children or teens without first discussing it with your child's healthcare provider.  Ice    Ice reduces muscle pain and swelling. It helps most during the first 24 to 48 hours after an injury.    Wrap an ice pack or a bag of frozen peas in a thin towel. Never put ice directly on your skin.    Place the ice where your back hurts the most.    Don t ice for more than 20 minutes at a time.    You can use ice several times a day.  Medicines  Over-the-counter pain relievers include acetaminophen and anti-inflammatory medicines, which includes aspirin, naproxen, or ibuprofen. They can help ease discomfort. Some also reduce swelling.    Tell your healthcare provider about any medicines you are already taking.    Take medicines only as directed.  Manipulation and massage  Having manipulation by an osteopathic doctor or chiropractor may be helpful. Getting a massage also may help.   Heat  After the first 48 hours, heat can relax sore muscles and improve blood flow.    Try a warm bath or shower. Or use a heating pad set on low. To prevent a burn, keep a cloth between you and the heating  pad.    Don t use a heating pad for more than 15 minutes at a time. Never sleep on a heating pad.  [employer] last reviewed this educational content on 6/1/2018 2000-2021 [employer]. All rights reserved. This information is not intended as a substitute for professional medical care. Always follow your healthcare professional's instructions.               EDIE Aquino, CNP  [facility] Provider

## 2021-10-03 ENCOUNTER — HEALTH MAINTENANCE LETTER (OUTPATIENT)
Age: 46
End: 2021-10-03

## 2021-11-28 ENCOUNTER — HEALTH MAINTENANCE LETTER (OUTPATIENT)
Age: 46
End: 2021-11-28

## 2021-12-09 NOTE — PROGRESS NOTES
VIDEO VISIT PROGRESS NOTE      CHIEF COMPLAINT  Chief Complaint   Patient presents with   • Video Visit   • Sinus Problem       SUBJECTIVE  Sandie Mcintosh is a 53 year old/female who is requesting a Video Visit (V-Visit) for evaluation of sinus congestion for the past 5 days.  Patient denies fevers, body aches, chills, facial pain, headaches, sore throat, coughing, GI complaints, loss of taste or smell, recent sick contacts or recent travel.  She does have seasonal allergies.  No history of asthma or autoimmune diseases.  She has been taking over-the-counter cold medicine with occasional relief.  She is fully vaccinated for COVID.  She reports that she is scheduled to fly in 5 days.  She states this feels like a sinus infection and is concerned about flying.    MEDICATIONS  Current Outpatient Medications   Medication Sig Dispense Refill   • oxymetazoline (AFRIN) 0.05 % nasal spray Spray 1 spray in each nostril 2 times daily for 3 days. 1 each    • guaiFENesin (MUCINEX) 600 MG 12 hr tablet Take 1 tablet by mouth 2 times daily for 10 days. 20 tablet 0   • ibuprofen (Advil) 200 MG capsule Take 4 capsules by mouth 2 times daily as needed for Pain.     • Levonorgestrel (MIRENA, 52 MG, IU) Indications: placed 3/13/2020     • cholecalciferol (VITAMIN D3) 1000 UNITS tablet Take 4 tablets by mouth daily. (Patient taking differently: Take 1,000 Units by mouth 2 times daily. ) 1 tablet 0     No current facility-administered medications for this visit.       HISTORIES  I have personally reviewed and updated the following electronic medical record sections: Current medications, Allergies, Problem list, Past Medical History, Past Surgical History, Social History and Family History    REVIEW OF SYSTEMS  See HPI     PHYSICAL EXAM  Information acquired with patient assistance, demonstration and feedback due to two-way video visit method of visit.  General:   awake, alert and oriented and in no acute distress  HEENT:    [facility] - Procedure Note    Date of Service: 9/15/2020  Procedure performed: Left Greater Trochanteric Bursa Injection  Diagnosis: Left Trochanteric Bursitis  : Tiara Montesinos MD, EDIE Fong CNP   Anesthesia: none    Indications:   Kobe Hanson Jr. is a 45 year old male was sent by Brijesh Arcos PA-C for left trochanteric bursitis.  They have a history of left sided back into the left lateral thigh.  Exam shows tenderness to left greater trochanter bursa and they have tried conservative treatment including lumbar ALFREDO, medications, exercise.    MRI was done on 8/26/2020 which showed:    FINDINGS:  Alignment is maintained. Bone marrow is within normal limits.  Multilevel mild disc height loss and mild facet arthropathy are  present as detailed below. The conus medullaris and cauda equina are  unremarkable. Paraspinal soft tissues are unremarkable.     Segmental Analysis:      T12-L1:  No significant spinal canal or foraminal stenosis.      L1-L2:  No significant spinal canal or foraminal stenosis.       L2-L3:  No significant spinal canal or foraminal stenosis.       L3-L4:  Disc bulge. Subtle posterior disc T2 hyperintensity is present  suggestive of mild annular fissure. Mild bilateral facet arthropathy.  Mild bilateral foraminal stenosis. Mild spinal canal stenosis.       L4-L5:  Disc bulge. Central disc protrusion is present with  curvilinear disc T2 hyperintensity consistent with annular fissure.  This probably contacts the traversing left L5 nerve root within the  lateral recess (series 5 image 23). Mild bilateral facet arthropathy.   Mild bilateral foraminal stenosis. Mild spinal canal stenosis.     L5-S1:  Disc bulge. Subtle posterior disc T2 hyperintensity is present  suggestive of mild annular fissure. Bulge is in close proximity to the  traversing left S1 nerve root without evidence of displacement (series  5 image 30). Mild bilateral facet  arthropathy. Mild bilateral  foraminal stenosis. No significant spinal canal stenosis.                                                                      IMPRESSION:    Degenerative disc disease at L3-L4, L4-L5, and L5-S1. Central disc  protrusion at L4-L5 with annular fissure. More subtle disc  abnormalities at L3-4 and L5-S1.    Options/alternatives, benefits and risks were discussed with the patient including bruising, infection and damage to nearby structures. Questions were answered to his satisfaction and he agrees to proceed. Voluntary informed consent was obtained and signed.     Vitals were reviewed: Yes  Allergies were reviewed:  Yes   Medications were reviewed:  Yes     Procedure:  After getting informed consent, patient was placed in a side lying position on the procedure table.   A Pause for the Cause was performed.  Patient was prepped and draped in sterile fashion.     The area over the left trochanter was palpated, and the tender area was identified, corresponding to the area of the trochanteric bursa.  The area was cleaned with Chloroprep.  A 25-gauge, 3.5-inch needle was inserted, aiming towards the trochanter.  When bone was encountered, the needle was slightly drawn.  A solution containing local anesthesic and steroid was injected.  The needle was removed.  Hemostasis was achieved. Bandaids were placed as appropriate.    In total, 1 ml of 0.5% bupivacaine, 1ml of lidocaine 1% and 1 ml (40 mg) of kenalog was injected.    Hemostasis was achieved, the area was cleaned, and bandaids were placed when appropriate.  The patient tolerated the procedure well, and was taken to the recovery room.    Images were saved to PACS.    Pre-procedure pain score: 7/10  Post-procedure pain score: 4/10     Assessment/Plan: Kobe Hanson Jr. is a 45 year old male s/p left greater trochanteric bursa steroid injection today for hip pan.     1. Following today's procedure, the patient was advised to contact the  normocephalic, atraumatic and no audible congestion or voice changes.  No trismus, excessive drooling, rhinorrhea.  No facial pain with palpation.  Hearing grossly intact.  Sclerae and conjunctiva clear.  Lungs:   good air entry, no rales or wheezes, no rhonchi and no coughing or dyspnea.  Skin:   color, texture, turgor are normal, no bruises, rashes or lesions     ASSESSMENT/PLAN  Suspected COVID-19 virus infection  - POST VIRTUAL VISIT TESTING; Future  - 2019 NOVEL CORONAVIRUS (SARS-COV-2)    Acute viral sinusitis  - POST VIRTUAL VISIT TESTING; Future  - oxymetazoline (AFRIN) 0.05 % nasal spray; Spray 1 spray in each nostril 2 times daily for 3 days.  Dispense: 1 each  - guaiFENesin (MUCINEX) 600 MG 12 hr tablet; Take 1 tablet by mouth 2 times daily for 10 days.  Dispense: 20 tablet; Refill: 0  - 2019 NOVEL CORONAVIRUS (SARS-COV-2)       MEDICAL DECISION MAKING  History and exam findings are consistent with viral sinusitis.    Patient is advised to begin Mucinex, saline spray, and Flonase.    A Couple days leading up to flight she may start Afrin nasal spray.  Symptomatic home care discussed.  COVID testing and isolation are advised 1st.    Discussed worsening symptoms and what warrants antibiotics.    FOLLOW UP  Return for As needed .    CONSENT   This visit is being performed virtually.  Clinician Location: [facility] Virtual Visits  Sandie's Location: Home      JOAQUIN Lowery  12/09/21  8:33 AM         [facility] [place] for any of the following:   Fever, chills, or night sweats   New onset of pain, numbness, or weakness   Any questions/concerns regarding the procedure  If unable to contact the Pain Center, the patient was instructed to go to a local Emergency Room for any complications.   2. The patient should follow-up with the referring provider in 2 weeks for post-procedure evaluation.      Tiara Montesinos MD  [facility] [place]

## 2021-12-21 NOTE — PROGRESS NOTES
Saqib is a 46 year old who is being evaluated via a billable video visit.      How would you like to obtain your AVS? MyChart  If the video visit is dropped, the invitation should be resent by: Text to cell phone: [phone]  Will anyone else be joining your video visit? No   Is Pt currently in [place]? Yes    NOTE:  If Pt is not in [place], Appointment needs to be canceled and rescheduled.      Mikayla Ernandez MA  [facility]          Video Start Time: 2:09 PM    [facility]     Date of visit: 12/23/2021      Assessment:   Kobe Hanson Jr. is a 46 year old male with no significant past medical history presents with complaints of chronic back pain.      1. Chronic low back pain: Onset of pain about 20 years ago without any inciting event.  Pain is worse with bending activities. Majority of the pain is on the left side. Located in low back and radiates into the buttock and more distally into the posterior aspect of the leg when more severe. Initial exam showed positive SLR on left. MRI shows disc bulges at L3-4, L4-5 and L5-S1 with disc material contacting the left L5 nerve root at L4-5 and the left S1 nerve root at L5-S1. There is mild facet arthropathy at these levels.  Etiology of pain is likely multifactorial secondary to lumbar radiculitis, discogenic pain and potentially some contribution from mild facet arthropathy.      2. Bilateral trochanteric bursitis       Visit Diagnoses:  1. Chronic pain syndrome        Plan:    Saqib meets the criteria for the diagnosis intractable pain, that is the cause of his pain cannot be easily removed and he has tried most means of traditional pain management, and remains a candidate for medical cannabis. For this reason, I have re-certified him through the [place] department of health website for medical cannabis. He will be contacted for next steps.     He will follow up with his primary care provider for ongoing pain management  "and to discuss benefit/effect of medical cannabis. He will follow up with me or Dr. Montesinos if interested in re-certification in approximately one year.       Scarlett Cresencio APRSASHA [facility]     -------------------------------------------------    Subjective:    Chief complaint:   Chief Complaint   Patient presents with     Pain       Interval history:  Kobe Hanson Jr. is a 46 year old male last seen on 4/12/2021.  He is a patient of Dr. Montesinos seen in follow up.     Recommendations/plan at the last visit included:  1. Physical Therapy: He should continue with his HEP.   2. Diagnostic Studies: No additional imaging needed.   3. Medication Management:    1. Continue medical cannabis. He is getting excellent pain benefit from this.   2. Continue Ibuprofen PRN, not exceeding 2,400 mg daily  3. Can consider nortriptyline or Cymbalta in the future if needed.   4. Further procedures recommended: May repeat bilateral trochanteric bursa injections PRN.   5. Follow up: in 9 months for re-certification.     Since his last visit, Kobe Hanson Jr. reports:  -His pain is about the same as it was at last visit, still doing well.   -He was certified for medical cannabis by Dr. Montesinos in January of 2021 and has continued on a regular basis. He presents for re-certification. He reports excellent pain benefit, \"the cannabis allows me to do a full physical therapy regimen and yoga, its amazing.\"  He has been able to discontinue regular use of ibuprofen.   -He goes to [employer]. He uses a Rosmery oral oil (equal THC/CBD) during the day and a tangerine THC dominant sublingual spray. He finds this regimen helpful for pain and sleep. He spends about $65 a month, there is always a sale that is going on. He is very interested in re-certification.     Pain Information:   Pain rating: averages 2/10 on a 0-10 scale.    Current pain medications:     Medical cannabis (Rosmery oral oil (equal THC/CBD) " "during the day and a tangerine (THC dominant) sublingual spray)- very helpful    Current MME:     Review of [place] Prescription Monitoring Program (): No concern for abuse or misuse of controlled medications based on this report.     Annual Controlled Substance Agreement/UDS due date:     Previous Medications: (H--helped; HI--Helped initially; SWH-- somewhat helpful, NH--No help; W--worse; SE--side effects)   Medrol dose jez - H  Norco - H  Cyclobenzaprine - ?  Icy/hot - [place]  aspercreme - [place]  Tylenol - [place]  CBD oil - H but SE of \"feeling high\"     Past Pain Treatments:  Behavioral interventions: None  PT: Yes - has done multiple times. Still doing HEP 5 days per week. Incorporating yoga.   Manual Medicine: yes - was doing prior to COVID - H  Surgeries: None  Acupuncture: None  TENs Unit: Yes - [facility] for transient benefit  Injections:   - Left L5-S1 Transforaminal epidural steroid injection on 6/29/2020 - H for 2-3 weeks, 100 % relief during that time.   - Left trochanteric bursa injection on 9/15/2020 - [facility]  - Right trochanteric bursa injection on 10/27/2020 - [facility]  - Bilateral trochanteric bursa injections on 12/17/2020 by Dr. Meza - [facility] for 2-3 weeks  Other:   - inversion table - H  - gluten free diet - H    Medications:  Current Outpatient Medications   Medication Sig Dispense Refill     desonide (DESOWEN) 0.05 % external cream Apply topically 2 times daily To aa on eyelids for 5-7 days. Tapering with improvement. 60 g 0     ibuprofen (ADVIL/MOTRIN) 200 MG capsule Take 200-800 mg by mouth as needed for fever        medical cannabis (Patient's own supply) See Admin Instructions (The purpose of this order is to document that the patient reports taking medical cannabis.  This is not a prescription, and is not used to certify that the patient has a qualifying medical condition.)       methylPREDNISolone (MEDROL DOSEPAK) 4 MG tablet therapy pack Follow Package Directions 21 tablet 0     triamcinolone (KENALOG) 0.1 " % external cream Apply topically 2 times daily To aa on chest for 1-2 weeks during flares. Tapering with improvement. Do not use on face or body folds. 60 g 1       Medical History: any changes in medical history since they were last seen? No    Review of Systems: A 10-point review of systems was negative, with the exception of chronic pain issues.      Objective:    Physical Exam:  There were no vitals taken for this visit.  Constitutional: Well developed, well nourished, appears stated age.  HEENT: Head atraumatic, normocephalic. Eyes without conjunctival injection or jaundice. Oropharynx clear. Neck supple. No obvious neck masses.  Skin: No rash, lesions, or petechiae of exposed skin.   Psychiatric/mental status: Alert, without lethargy or stupor. Speech fluent. Appropriate affect. Mood normal. Able to follow commands without difficulty.     Imaging:  Imaging Studies:   Lumbar MRI completed on 8/26/2019 showed:  FINDINGS:  Alignment is maintained. Bone marrow is within normal limits.  Multilevel mild disc height loss and mild facet arthropathy are  present as detailed below. The conus medullaris and cauda equina are  unremarkable. Paraspinal soft tissues are unremarkable.     Segmental Analysis:      T12-L1:  No significant spinal canal or foraminal stenosis.      L1-L2:  No significant spinal canal or foraminal stenosis.       L2-L3:  No significant spinal canal or foraminal stenosis.       L3-L4:  Disc bulge. Subtle posterior disc T2 hyperintensity is present  suggestive of mild annular fissure. Mild bilateral facet arthropathy.  Mild bilateral foraminal stenosis. Mild spinal canal stenosis.       L4-L5:  Disc bulge. Central disc protrusion is present with  curvilinear disc T2 hyperintensity consistent with annular fissure.  This probably contacts the traversing left L5 nerve root within the  lateral recess (series 5 image 23). Mild bilateral facet arthropathy.   Mild bilateral foraminal stenosis. Mild spinal  canal stenosis.     L5-S1:  Disc bulge. Subtle posterior disc T2 hyperintensity is present  suggestive of mild annular fissure. Bulge is in close proximity to the  traversing left S1 nerve root without evidence of displacement (series  5 image 30). Mild bilateral facet arthropathy. Mild bilateral  foraminal stenosis. No significant spinal canal stenosis.                                                                      IMPRESSION:    Degenerative disc disease at L3-L4, L4-L5, and L5-S1. Central disc  protrusion at L4-L5 with annular fissure. More subtle disc  abnormalities at L3-4 and L5-S1.       BILLING TIME DOCUMENTATION:   The total TIME spent on this patient on the date of the encounter/appointment was 17 minutes.      TOTAL TIME includes:   Time spent preparing to see the patient (reviewing records and tests)   Time spent face to face (or over the phone) with the patient   Time spent ordering tests, medications, procedures and referrals   Time spent Referring and communicating with other healthcare professionals   Time spent documenting clinical information in Epic         Video-Visit Details    Type of service:  Video Visit    Video End Time:2:22 PM    Originating Location (pt. Location): Home    Distant Location (provider location):  [facility] [place]     Platform used for Video Visit: [employer]

## 2021-12-23 ENCOUNTER — VIRTUAL VISIT (OUTPATIENT)
Dept: PALLIATIVE MEDICINE | Facility: CLINIC | Age: 46
End: 2021-12-23
Payer: COMMERCIAL

## 2021-12-23 DIAGNOSIS — G89.4 CHRONIC PAIN SYNDROME: Primary | ICD-10-CM

## 2021-12-23 PROCEDURE — 99212 OFFICE O/P EST SF 10 MIN: CPT | Mod: 95 | Performed by: NURSE PRACTITIONER

## 2021-12-23 NOTE — PATIENT INSTRUCTIONS
----------------------------------------------------------------  [facility] Number:  [phone]     Call with any questions about your care and for scheduling assistance.     Calls are returned Monday through Friday between 8 AM and 4:30 PM. We usually get back to you within 2 business days depending on the issue/request.    If we are prescribing your medications:    For opioid medication refills, call the clinic or send a [employer] message 7 days in advance.  Please include:    Name of requested medication    Name of the pharmacy.    For non-opioid medications, call your pharmacy directly to request a refill. Please allow 3-4 days to be processed.     Per [place] State Law:    All controlled substance prescriptions must be filled within 30 days of being written.      For those controlled substances allowing refills, pickup must occur within 30 days of last fill.      We believe regular attendance is key to your success in our program!      Any time you are unable to keep your appointment we ask that you call us at least 24 hours in advance to cancel.This will allow us to offer the appointment time to another patient.     Multiple missed appointments may lead to dismissal from the clinic.

## 2022-05-15 ENCOUNTER — HEALTH MAINTENANCE LETTER (OUTPATIENT)
Age: 47
End: 2022-05-15

## 2022-09-10 ENCOUNTER — HEALTH MAINTENANCE LETTER (OUTPATIENT)
Age: 47
End: 2022-09-10

## 2023-01-03 ENCOUNTER — TELEPHONE (OUTPATIENT)
Dept: PALLIATIVE MEDICINE | Facility: CLINIC | Age: 48
End: 2023-01-03

## 2023-01-03 NOTE — TELEPHONE ENCOUNTER
Routing to scheduling pool - pt needs to schedule for next available with Yamel. There is nothing sooner that nursing can open/facilitate    Fina MENDEZ RN Care Coordinator  [facility]

## 2023-01-03 NOTE — TELEPHONE ENCOUNTER
Noted by nursing    Fina MENDEZ RN Care Coordinator  [facility]

## 2023-01-03 NOTE — TELEPHONE ENCOUNTER
[facility] Call Center    Phone Message    May a detailed message be left on voicemail: yes     Reason for Call: Other: Appointment Request From: Kobe Hanson Jr. With Provider: Tiara Montesinos MD [[facility] [place]]     Preferred Date Range: 1/3/2023 - 1/13/2023     Preferred Times: Any Time     Reason for visit: Request an Appointment     Comments:  Renewal of [place] medical cannabis registry via virtual visit      Action Taken: Other: Routed to Pain Nurse and Pain      Travel Screening: Not Applicable

## 2023-01-03 NOTE — TELEPHONE ENCOUNTER
Spoke with Bill the patient and he's no longer in in the net work for [facility] and PCP is not a Provider any longer.  Patient is going to work with his PCP and see if they will being to help find a Provider to continue care.

## 2023-01-27 ENCOUNTER — TELEPHONE (OUTPATIENT)
Dept: PALLIATIVE MEDICINE | Facility: CLINIC | Age: 48
End: 2023-01-27

## 2023-01-27 NOTE — TELEPHONE ENCOUNTER
[facility] Call Center    Phone Message    May a detailed message be left on voicemail: yes     Reason for Call: Other: Patient is calling requesting to switch providers. The patient had an insurance change and Dr. Montesinos is no longer under his coverage. He is needing to find a provider that is in his coverage that is also part of the medical cannabis registry. Please call patient back to discuss further     Action Taken: Message routed to:  Other: [facility]    Travel Screening: Not Applicable

## 2023-01-31 NOTE — TELEPHONE ENCOUNTER
Cad pt. Advised that would need new referral as has been > one year since being seen, advised that we do not do MM cert only. Advised  That we do cert for pt's that are a part of pain management program however pt reports he is already working with pain program outside of system they just do not certify. Advised pt to contact PCP/GP offices that would be in his insurance network to inquire if they do MM recertifications as now many offices have at least one provider that does.    Arpita RAMOS, RN Care Coordinator  [facility]  Pain [facility]

## 2023-06-03 ENCOUNTER — HEALTH MAINTENANCE LETTER (OUTPATIENT)
Age: 48
End: 2023-06-03

## 2024-02-13 ENCOUNTER — E-VISIT (OUTPATIENT)
Dept: INTERNAL MEDICINE | Facility: CLINIC | Age: 49
End: 2024-02-13
Payer: COMMERCIAL

## 2024-02-13 DIAGNOSIS — M54.9 BACK PAIN, UNSPECIFIED BACK LOCATION, UNSPECIFIED BACK PAIN LATERALITY, UNSPECIFIED CHRONICITY: Primary | ICD-10-CM

## 2024-02-13 PROCEDURE — 99207 PR NON-BILLABLE SERV PER CHARTING: CPT | Performed by: INTERNAL MEDICINE

## 2024-07-07 ENCOUNTER — HEALTH MAINTENANCE LETTER (OUTPATIENT)
Age: 49
End: 2024-07-07

## 2025-05-19 ENCOUNTER — APPOINTMENT (OUTPATIENT)
Age: 50
Setting detail: DERMATOLOGY
End: 2025-05-19

## 2025-05-19 DIAGNOSIS — L24 IRRITANT CONTACT DERMATITIS: ICD-10-CM

## 2025-05-19 DIAGNOSIS — L82.0 INFLAMED SEBORRHEIC KERATOSIS: ICD-10-CM

## 2025-05-19 DIAGNOSIS — L259 CONTACT DERMATITIS AND OTHER ECZEMA, UNSPECIFIED CAUSE: ICD-10-CM | Status: STABLE

## 2025-05-19 PROBLEM — L30.9 DERMATITIS, UNSPECIFIED: Status: ACTIVE | Noted: 2025-05-19

## 2025-05-19 PROCEDURE — ? COUNSELING

## 2025-05-19 PROCEDURE — 99214 OFFICE O/P EST MOD 30 MIN: CPT | Mod: 25

## 2025-05-19 PROCEDURE — ? BENIGN DESTRUCTION

## 2025-05-19 PROCEDURE — ? PRESCRIPTION MEDICATION MANAGEMENT

## 2025-05-19 PROCEDURE — 17110 DESTRUCTION B9 LES UP TO 14: CPT

## 2025-05-19 ASSESSMENT — LOCATION SIMPLE DESCRIPTION DERM
LOCATION SIMPLE: SCALP
LOCATION SIMPLE: UPPER BACK
LOCATION SIMPLE: LEFT POSTERIOR AXILLA
LOCATION SIMPLE: CHEST
LOCATION SIMPLE: ABDOMEN
LOCATION SIMPLE: LEFT FOREHEAD

## 2025-05-19 ASSESSMENT — LOCATION DETAILED DESCRIPTION DERM
LOCATION DETAILED: STERNUM
LOCATION DETAILED: LEFT MEDIAL FOREHEAD
LOCATION DETAILED: LEFT POSTERIOR AXILLA
LOCATION DETAILED: SUPERIOR THORACIC SPINE
LOCATION DETAILED: LEFT SUPERIOR PARIETAL SCALP
LOCATION DETAILED: LEFT RIB CAGE

## 2025-05-19 ASSESSMENT — LOCATION ZONE DERM
LOCATION ZONE: FACE
LOCATION ZONE: TRUNK
LOCATION ZONE: SCALP
LOCATION ZONE: AXILLAE

## 2025-05-19 ASSESSMENT — ITCH NUMERIC RATING SCALE: HOW SEVERE IS YOUR ITCHING?: 1

## 2025-05-19 NOTE — PROCEDURE: BENIGN DESTRUCTION
Consent: The patient's consent was obtained including but not limited to risks of crusting, scabbing, blistering, scarring, darker or lighter pigmentary change, recurrence, incomplete removal and infection.
Render Note In Bullet Format When Appropriate: No
Detail Level: Detailed
Post-Care Instructions: I reviewed with the patient in detail post-care instructions. Patient is to wear sunprotection, and avoid picking at any of the treated lesions. Pt may apply Vaseline to crusted or scabbing areas.
Treatment Number (Will Not Render If 0): 0
Anesthesia Volume In Cc: 0.5
Medical Necessity Clause: This procedure was medically necessary because the lesions that were treated were:
Medical Necessity Information: It is in your best interest to select a reason for this procedure from the list below. All of these items fulfill various CMS LCD requirements except the new and changing color options.

## 2025-05-19 NOTE — PROCEDURE: PRESCRIPTION MEDICATION MANAGEMENT
Initiate Treatment: Tacrolimus .1% ointment twice daily until clear and then as needed
Render In Strict Bullet Format?: No
Detail Level: Zone
Continue Regimen: Tacrolimus .1% ointment twice daily as needed for flares\\nContinue using Vanicream cleansers and moisturizers

## 2025-05-19 NOTE — HPI: RASH
What Type Of Note Output Would You Prefer (Optional)?: Bullet Format
How Severe Is Your Rash?: moderate
Is This A New Presentation, Or A Follow-Up?: Follow Up Rash
Additional History: Allergic or contact dermatitis vs atopic dermatitis vs seborrheic dermatitis vs psoriasis. The patient is on current treatment of tacrolimus .1% ointment bid prn, pimecrolimus to the lip area qd, Diprolene cream bid prn to the left hip and the chest. He uses Vanicream products and takes a daily antihistamine (4 tablets qam). He states the dermatitis has been well controlled and stable. He has not needed to use any of the prescriptions within the past 3 months and has just been using Vanicream. The patient reports he has a new dermatitis patch on his left axilla that started about 2 months ago. He has attempted treatment with tacrolimus and Diprolene cream for a few days with no improvement.

## 2025-07-13 ENCOUNTER — HEALTH MAINTENANCE LETTER (OUTPATIENT)
Age: 50
End: 2025-07-13